# Patient Record
Sex: FEMALE | Race: WHITE | Employment: UNEMPLOYED | URBAN - METROPOLITAN AREA
[De-identification: names, ages, dates, MRNs, and addresses within clinical notes are randomized per-mention and may not be internally consistent; named-entity substitution may affect disease eponyms.]

---

## 2017-04-07 PROBLEM — E78.5 HYPERLIPIDEMIA, UNSPECIFIED HYPERLIPIDEMIA TYPE: Status: ACTIVE | Noted: 2017-04-07

## 2017-09-19 PROCEDURE — 82043 UR ALBUMIN QUANTITATIVE: CPT | Performed by: FAMILY MEDICINE

## 2017-09-19 PROCEDURE — 82570 ASSAY OF URINE CREATININE: CPT | Performed by: FAMILY MEDICINE

## 2017-10-30 PROCEDURE — 87338 HPYLORI STOOL AG IA: CPT | Performed by: FAMILY MEDICINE

## 2018-03-30 PROCEDURE — 82570 ASSAY OF URINE CREATININE: CPT | Performed by: FAMILY MEDICINE

## 2018-03-30 PROCEDURE — 82043 UR ALBUMIN QUANTITATIVE: CPT | Performed by: FAMILY MEDICINE

## 2018-05-18 PROCEDURE — 88175 CYTOPATH C/V AUTO FLUID REDO: CPT | Performed by: OBSTETRICS & GYNECOLOGY

## 2018-06-26 PROCEDURE — 87086 URINE CULTURE/COLONY COUNT: CPT | Performed by: FAMILY MEDICINE

## 2019-05-16 PROCEDURE — 86803 HEPATITIS C AB TEST: CPT | Performed by: FAMILY MEDICINE

## 2019-09-28 PROBLEM — H25.13 NUCLEAR SCLEROTIC CATARACT OF BOTH EYES: Status: ACTIVE | Noted: 2019-09-28

## 2019-09-28 PROBLEM — E11.9 TYPE 2 DIABETES MELLITUS WITHOUT COMPLICATION, WITHOUT LONG-TERM CURRENT USE OF INSULIN (HCC): Status: ACTIVE | Noted: 2019-09-28

## 2019-09-28 PROBLEM — H35.3132 NONEXUDATIVE AGE-RELATED MACULAR DEGENERATION, BILATERAL, INTERMEDIATE DRY STAGE: Status: ACTIVE | Noted: 2019-09-28

## 2019-09-28 PROBLEM — H04.123 DRY EYE SYNDROME OF BILATERAL LACRIMAL GLANDS: Status: ACTIVE | Noted: 2019-09-28

## 2019-11-23 PROBLEM — H02.883 MEIBOMIAN GLAND DYSFUNCTION (MGD) OF BOTH EYES: Status: ACTIVE | Noted: 2019-11-23

## 2019-11-23 PROBLEM — H02.886 MEIBOMIAN GLAND DYSFUNCTION (MGD) OF BOTH EYES: Status: ACTIVE | Noted: 2019-11-23

## 2021-06-10 PROBLEM — M54.31 SCIATICA OF RIGHT SIDE: Status: ACTIVE | Noted: 2021-06-10

## 2021-06-22 PROBLEM — S13.4XXA WHIPLASH INJURY TO NECK: Status: ACTIVE | Noted: 2021-06-22

## 2021-06-22 PROBLEM — M25.511 SHOULDER PAIN, BILATERAL: Status: ACTIVE | Noted: 2021-06-22

## 2021-06-22 PROBLEM — M25.512 SHOULDER PAIN, BILATERAL: Status: ACTIVE | Noted: 2021-06-22

## 2021-06-22 PROBLEM — M54.2 CERVICALGIA: Status: ACTIVE | Noted: 2021-06-22
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
## 2021-08-09 PROBLEM — S13.4XXD WHIPLASH INJURIES, SUBSEQUENT ENCOUNTER: Status: ACTIVE | Noted: 2021-06-22

## 2021-08-09 PROBLEM — G44.86 CERVICOGENIC HEADACHE: Status: ACTIVE | Noted: 2021-08-09

## 2021-08-09 PROBLEM — M47.812 CS (CERVICAL SPONDYLOSIS): Status: ACTIVE | Noted: 2021-08-09

## 2022-03-28 PROBLEM — M50.00 CERVICAL DISC DISORDER WITH MYELOPATHY: Status: ACTIVE | Noted: 2022-03-28

## 2023-03-15 LAB
ANION GAP SERPL CALC-SCNC: 11 MMOL/L (ref 0–18)
BASOPHILS # BLD AUTO: 0.04 X10(3) UL (ref 0–0.2)
BASOPHILS NFR BLD AUTO: 0.4 %
BUN BLD-MCNC: 23 MG/DL (ref 7–18)
BUN/CREAT SERPL: 19.7 (ref 10–20)
CALCIUM BLD-MCNC: 9.8 MG/DL (ref 8.5–10.1)
CHLORIDE SERPL-SCNC: 105 MMOL/L (ref 98–112)
CO2 SERPL-SCNC: 22 MMOL/L (ref 21–32)
CREAT BLD-MCNC: 1.17 MG/DL
DEPRECATED RDW RBC AUTO: 41.6 FL (ref 35.1–46.3)
EOSINOPHIL # BLD AUTO: 0.16 X10(3) UL (ref 0–0.7)
EOSINOPHIL NFR BLD AUTO: 1.8 %
ERYTHROCYTE [DISTWIDTH] IN BLOOD BY AUTOMATED COUNT: 13.2 % (ref 11–15)
GFR SERPLBLD BASED ON 1.73 SQ M-ARVRAT: 51 ML/MIN/1.73M2 (ref 60–?)
GLUCOSE BLD-MCNC: 139 MG/DL (ref 70–99)
HCT VFR BLD AUTO: 37.5 %
HGB BLD-MCNC: 12.7 G/DL
IMM GRANULOCYTES # BLD AUTO: 0.02 X10(3) UL (ref 0–1)
IMM GRANULOCYTES NFR BLD: 0.2 %
LYMPHOCYTES # BLD AUTO: 1.71 X10(3) UL (ref 1–4)
LYMPHOCYTES NFR BLD AUTO: 19 %
MCH RBC QN AUTO: 29.4 PG (ref 26–34)
MCHC RBC AUTO-ENTMCNC: 33.9 G/DL (ref 31–37)
MCV RBC AUTO: 86.8 FL
MONOCYTES # BLD AUTO: 0.64 X10(3) UL (ref 0.1–1)
MONOCYTES NFR BLD AUTO: 7.1 %
NEUTROPHILS # BLD AUTO: 6.43 X10 (3) UL (ref 1.5–7.7)
NEUTROPHILS # BLD AUTO: 6.43 X10(3) UL (ref 1.5–7.7)
NEUTROPHILS NFR BLD AUTO: 71.5 %
OSMOLALITY SERPL CALC.SUM OF ELEC: 292 MOSM/KG (ref 275–295)
PLATELET # BLD AUTO: 317 10(3)UL (ref 150–450)
POTASSIUM SERPL-SCNC: 4 MMOL/L (ref 3.5–5.1)
RBC # BLD AUTO: 4.32 X10(6)UL
SODIUM SERPL-SCNC: 138 MMOL/L (ref 136–145)
WBC # BLD AUTO: 9 X10(3) UL (ref 4–11)

## 2023-03-15 PROCEDURE — 93010 ELECTROCARDIOGRAM REPORT: CPT

## 2023-03-15 PROCEDURE — 99284 EMERGENCY DEPT VISIT MOD MDM: CPT

## 2023-03-15 PROCEDURE — 85025 COMPLETE CBC W/AUTO DIFF WBC: CPT | Performed by: EMERGENCY MEDICINE

## 2023-03-15 PROCEDURE — 80048 BASIC METABOLIC PNL TOTAL CA: CPT | Performed by: EMERGENCY MEDICINE

## 2023-03-15 PROCEDURE — 80048 BASIC METABOLIC PNL TOTAL CA: CPT

## 2023-03-15 PROCEDURE — 85025 COMPLETE CBC W/AUTO DIFF WBC: CPT

## 2023-03-15 PROCEDURE — 93005 ELECTROCARDIOGRAM TRACING: CPT

## 2023-03-16 ENCOUNTER — HOSPITAL ENCOUNTER (EMERGENCY)
Facility: HOSPITAL | Age: 68
Discharge: HOME | End: 2023-03-16
Attending: EMERGENCY MEDICINE
Payer: COMMERCIAL

## 2023-03-16 VITALS
HEART RATE: 82 BPM | OXYGEN SATURATION: 94 % | DIASTOLIC BLOOD PRESSURE: 64 MMHG | SYSTOLIC BLOOD PRESSURE: 143 MMHG | TEMPERATURE: 97 F | RESPIRATION RATE: 19 BRPM

## 2023-03-16 DIAGNOSIS — R03.0 ELEVATED BLOOD PRESSURE READING: ICD-10-CM

## 2023-03-16 DIAGNOSIS — G44.209 TENSION HEADACHE: Primary | ICD-10-CM

## 2023-03-16 LAB
ATRIAL RATE: 104 BPM
P AXIS: 69 DEGREES
P-R INTERVAL: 178 MS
Q-T INTERVAL: 346 MS
QRS DURATION: 72 MS
QTC CALCULATION (BEZET): 454 MS
R AXIS: -27 DEGREES
T AXIS: 62 DEGREES
VENTRICULAR RATE: 104 BPM

## 2023-03-16 PROCEDURE — 96374 THER/PROPH/DIAG INJ IV PUSH: CPT

## 2023-03-16 PROCEDURE — 96375 TX/PRO/DX INJ NEW DRUG ADDON: CPT

## 2023-03-16 PROCEDURE — 96361 HYDRATE IV INFUSION ADD-ON: CPT

## 2023-03-16 RX ORDER — KETOROLAC TROMETHAMINE 15 MG/ML
15 INJECTION, SOLUTION INTRAMUSCULAR; INTRAVENOUS ONCE
Status: COMPLETED | OUTPATIENT
Start: 2023-03-16 | End: 2023-03-16

## 2023-03-16 RX ORDER — TRAMADOL HYDROCHLORIDE 50 MG/1
25 TABLET ORAL EVERY 6 HOURS PRN
Qty: 10 TABLET | Refills: 0 | Status: SHIPPED | OUTPATIENT
Start: 2023-03-16 | End: 2023-03-21

## 2023-03-16 RX ORDER — DIPHENHYDRAMINE HYDROCHLORIDE 50 MG/ML
25 INJECTION INTRAMUSCULAR; INTRAVENOUS ONCE
Status: COMPLETED | OUTPATIENT
Start: 2023-03-16 | End: 2023-03-16

## 2023-03-16 RX ORDER — SODIUM CHLORIDE 9 MG/ML
125 INJECTION, SOLUTION INTRAVENOUS CONTINUOUS
Status: DISCONTINUED | OUTPATIENT
Start: 2023-03-16 | End: 2023-03-16

## 2023-03-16 RX ORDER — ACETAMINOPHEN, ASPIRIN AND CAFFEINE 250; 250; 65 MG/1; MG/1; MG/1
1 TABLET, FILM COATED ORAL EVERY 6 HOURS PRN
Qty: 10 TABLET | Refills: 0 | Status: SHIPPED | OUTPATIENT
Start: 2023-03-16

## 2023-03-16 RX ORDER — PROCHLORPERAZINE EDISYLATE 5 MG/ML
10 INJECTION INTRAMUSCULAR; INTRAVENOUS ONCE
Status: COMPLETED | OUTPATIENT
Start: 2023-03-16 | End: 2023-03-16

## 2023-03-16 NOTE — ED INITIAL ASSESSMENT (HPI)
Pt reports 3 weeks of headache/pressure, seen multiple times and dx w/ sinus infection, also had CT scan on Monday. Pt c/o continued s/s w/o relief from sudafed at home.

## 2023-03-16 NOTE — ED QUICK NOTES
Upon reassessment, patient resting in bed with eyes closed, breathing easily, and relaxed facial expressions. When asked about pain, patient states she is still in a lot of pain. Medication administered. Lights dimmed and another warm blanket provided. Will continue to monitor.

## 2023-03-16 NOTE — DISCHARGE INSTRUCTIONS
Try to stay well-hydrated at home. Please return to the emergency department if you develop worsening of your headache or new headache which is severe, associated with vision changes, associated with neck stiffness or fever, or if it is different from any other headache that you have had before. Return to the emergency department if you develop numbness, weakness or tingling or problems with coordination, or if you develop severe nausea and vomiting and are unable to keep down fluids at home. Keep the appointment you have with your primary care doctor. If your headache persist, you may make an appointment with a neurologist for follow-up.

## 2024-06-07 ENCOUNTER — HOSPITAL ENCOUNTER (EMERGENCY)
Facility: HOSPITAL | Age: 69
Discharge: HOME | End: 2024-06-07
Attending: EMERGENCY MEDICINE
Payer: COMMERCIAL

## 2024-06-07 VITALS
RESPIRATION RATE: 18 BRPM | BODY MASS INDEX: 32.1 KG/M2 | OXYGEN SATURATION: 95 % | TEMPERATURE: 99 F | HEIGHT: 64 IN | WEIGHT: 188 LBS | SYSTOLIC BLOOD PRESSURE: 165 MMHG | DIASTOLIC BLOOD PRESSURE: 74 MMHG | HEART RATE: 86 BPM

## 2024-06-07 DIAGNOSIS — R51.9 CHRONIC NONINTRACTABLE HEADACHE, UNSPECIFIED HEADACHE TYPE: Primary | ICD-10-CM

## 2024-06-07 DIAGNOSIS — G89.29 CHRONIC NONINTRACTABLE HEADACHE, UNSPECIFIED HEADACHE TYPE: Primary | ICD-10-CM

## 2024-06-07 LAB
ATRIAL RATE: 96 BPM
P AXIS: 48 DEGREES
P-R INTERVAL: 156 MS
Q-T INTERVAL: 356 MS
QRS DURATION: 68 MS
QTC CALCULATION (BEZET): 449 MS
R AXIS: -28 DEGREES
T AXIS: 40 DEGREES
VENTRICULAR RATE: 96 BPM

## 2024-06-07 PROCEDURE — 93010 ELECTROCARDIOGRAM REPORT: CPT

## 2024-06-07 PROCEDURE — 99284 EMERGENCY DEPT VISIT MOD MDM: CPT

## 2024-06-07 PROCEDURE — S0028 INJECTION, FAMOTIDINE, 20 MG: HCPCS | Performed by: EMERGENCY MEDICINE

## 2024-06-07 PROCEDURE — 93005 ELECTROCARDIOGRAM TRACING: CPT

## 2024-06-07 PROCEDURE — 96375 TX/PRO/DX INJ NEW DRUG ADDON: CPT

## 2024-06-07 PROCEDURE — 96374 THER/PROPH/DIAG INJ IV PUSH: CPT

## 2024-06-07 PROCEDURE — 99285 EMERGENCY DEPT VISIT HI MDM: CPT

## 2024-06-07 PROCEDURE — 96361 HYDRATE IV INFUSION ADD-ON: CPT

## 2024-06-07 RX ORDER — HYDRALAZINE HYDROCHLORIDE 50 MG/1
50 TABLET, FILM COATED ORAL 3 TIMES DAILY
COMMUNITY

## 2024-06-07 RX ORDER — SUMATRIPTAN 50 MG/1
50 TABLET, FILM COATED ORAL ONCE
Status: COMPLETED | OUTPATIENT
Start: 2024-06-07 | End: 2024-06-07

## 2024-06-07 RX ORDER — DEXAMETHASONE SODIUM PHOSPHATE 4 MG/ML
6 VIAL (ML) INJECTION ONCE
Status: COMPLETED | OUTPATIENT
Start: 2024-06-07 | End: 2024-06-07

## 2024-06-07 RX ORDER — DEXAMETHASONE 4 MG/1
8 TABLET ORAL
Qty: 6 TABLET | Refills: 0 | Status: SHIPPED | OUTPATIENT
Start: 2024-06-07 | End: 2024-06-10

## 2024-06-07 RX ORDER — FAMOTIDINE 10 MG/ML
20 INJECTION, SOLUTION INTRAVENOUS ONCE
Status: COMPLETED | OUTPATIENT
Start: 2024-06-07 | End: 2024-06-07

## 2024-06-07 RX ORDER — TOPIRAMATE 25 MG/ML
25 SOLUTION ORAL DAILY
Qty: 25 ML | Refills: 0 | Status: SHIPPED | OUTPATIENT
Start: 2024-06-07 | End: 2024-06-28

## 2024-06-07 RX ORDER — TOPIRAMATE 25 MG/1
25 TABLET ORAL DAILY
Qty: 21 TABLET | Refills: 0 | Status: SHIPPED | OUTPATIENT
Start: 2024-06-07 | End: 2024-06-28

## 2024-06-07 RX ORDER — HALOPERIDOL 5 MG/ML
2.5 INJECTION INTRAMUSCULAR ONCE
Status: DISCONTINUED | OUTPATIENT
Start: 2024-06-07 | End: 2024-06-07

## 2024-06-07 RX ORDER — ONDANSETRON 2 MG/ML
4 INJECTION INTRAMUSCULAR; INTRAVENOUS ONCE
Status: DISCONTINUED | OUTPATIENT
Start: 2024-06-07 | End: 2024-06-07

## 2024-06-07 RX ORDER — MORPHINE SULFATE 2 MG/ML
2 INJECTION, SOLUTION INTRAMUSCULAR; INTRAVENOUS ONCE
Status: COMPLETED | OUTPATIENT
Start: 2024-06-07 | End: 2024-06-07

## 2024-06-07 NOTE — ED INITIAL ASSESSMENT (HPI)
Pt presents with c/o migraines.  Pt report that she took Fioricet and Codeine, but it didn't help.  She reports nausea and feeling sick.  Pt states she took something for nausea at 10am without any relief.  Pt states that she is dizzy, feeling like she is going to pass out.  No visual disturbances.

## 2024-06-07 NOTE — ED PROVIDER NOTES
Patient Seen in: [facility] Emergency Department    History     Chief Complaint   Patient presents with    Headache    Dizziness     Stated Complaint: Migrane, Nausea    HPI  Pt c/o a 9/10 headache that began 4 months ago.  .  This is similar to previous headaches.  Pain is described as r side throbbing . Headache is associated with photophobia.  no fever, no stiff neck, no visual changes and no focal neuro deficit.      Past Medical History:    Bacterial overgrowth syndrome    positive by breath test: both hydrogen and methane criteria.    Diabetes (HCC)    Migraines    Other and unspecified hyperlipidemia    Unspecified essential hypertension       Past Surgical History:   Procedure Laterality Date    Colonoscopy      Excisional biopsy left  2008    Hernia surgery      umbilical hernia surgery    Hysterectomy      in 30's, uterine prolapse    Other surgical history  2012    left breast biopsy--normal/benign findings    Other surgical history  2012    right knee arthroscopy            Family History   Problem Relation Age of Onset    Hypertension Father     Hypertension Mother     Diabetes Maternal Grandmother     Lipids Maternal Grandmother     Hypertension Maternal Grandmother     Diabetes Maternal Grandfather     Lipids Maternal Grandfather     Hypertension Maternal Grandfather     Hypertension Sister     Hypertension Brother        Social History     Socioeconomic History    Marital status:    Tobacco Use    Smoking status: Never     Passive exposure: Never    Smokeless tobacco: Never   Vaping Use    Vaping status: Never Used   Substance and Sexual Activity    Alcohol use: No     Alcohol/week: 0.0 standard drinks of alcohol    Drug use: No     Social Determinants of Health      Received from [facility]       Review of Systems    Positive for stated complaint: Migrane, Nausea  Other systems are as noted in HPI.  Constitutional and vital signs reviewed.      All other systems  reviewed and negative except as noted above.    PSFH elements reviewed from today and agreed except as otherwise stated in HPI.    Physical Exam     ED Triage Vitals [06/07/24 1158]   /79   Pulse 96   Resp 18   Temp 98.5 °F (36.9 °C)   Temp src Oral   SpO2 97 %   O2 Device        Current:BP (!) 165/74   Pulse 86   Temp 98.5 °F (36.9 °C) (Oral)   Resp 18   Ht 162.6 cm (5' 4\")   Wt 85.3 kg   SpO2 95%   BMI 32.27 kg/m²   PULSE OX nl  Constitutional:  No acute distress  HEENT:  Head normocephalic and atraumatic. No scleral icterus or erythema. Pharynx moist without erythema or exudate.  CV:  Regular rate and rhythm. No murmur. Peripheral pulses intact.  Respiratory:  Lungs clear to auscultation bilaterally  Abdomen:  Soft, non-tender, non-distended.  Back:  No CVA or vertebral tenderness  Skin:  Normal color. Warm and Dry  Extremities:  Non-tender. No pedal edema.   Neuro:  Oriented x3.  PERRL, EOMI. CN II - XII grossly intact.  No gross motor deficits.  5/5 strength in all distribution.  Sensation fully intact.      DDX to include tension headache vs. Migraine headache vs. Sinusitis vs. SAH        ED Course   Labs Reviewed - No data to display  EKG    Rate, intervals and axes as noted on EKG Report.  Rate: 96  Rhythm: Sinus Rhythm  Reading: nsr with lvh            MDM     Medical Decision Making  Problems Addressed:  Chronic nonintractable headache, unspecified headache type: chronic illness or injury with exacerbation, progression, or side effects of treatment    Risk  Prescription drug management.          Re-Exam: feeling better will dc    Patient presenting with headache.  Patient  with no abnormal symptoms for patient.  Patient was discharged home. Patient advised to return to ER if alteration in mental status, onset of fevers, visual changes, or peripheral weakness/numbness/tingling.  Disposition and Plan     Clinical Impression:  1. Chronic nonintractable headache, unspecified headache type         Disposition:  Discharge    Follow-up:  Avtar Mccoy DO  [address]   [place] [address] [phone]    Follow up      Awa Sutton DO  [address] [facility] [address]  [facility] [id number] [phone]    Follow up        Medications Prescribed:  Discharge Medication List as of 6/7/2024  4:13 PM        START taking these medications    Details   topiramate (TOPAMAX) 25 MG Oral Tab Take 1 tablet (25 mg total) by mouth daily for 21 days., Normal, Disp-21 tablet, R-0      dexamethasone (DECADRON) 4 MG tablet Take 2 tablets (8 mg total) by mouth daily with breakfast for 3 days., Normal, Disp-6 tablet, R-0      Topiramate (EPRONTIA) 25 MG/ML Oral Solution Take 25 mg by mouth daily for 21 days., Normal, Disp-25 mL, R-0

## 2024-06-12 ENCOUNTER — TELEPHONE (OUTPATIENT)
Dept: NEUROLOGY | Facility: CLINIC | Age: 69
End: 2024-06-12

## 2024-06-12 NOTE — TELEPHONE ENCOUNTER
Pt called in was seen in ed 6/7 was advised to f/ up with dr kenney. Scheduled for Sept 4th at  1140. Pls advise where to schedule pt.

## 2024-06-28 ENCOUNTER — HOSPITAL ENCOUNTER (EMERGENCY)
Facility: HOSPITAL | Age: 69
Discharge: HOME | End: 2024-06-28
Attending: EMERGENCY MEDICINE
Payer: COMMERCIAL

## 2024-06-28 VITALS
OXYGEN SATURATION: 97 % | HEART RATE: 85 BPM | WEIGHT: 188 LBS | RESPIRATION RATE: 22 BRPM | DIASTOLIC BLOOD PRESSURE: 66 MMHG | HEIGHT: 64 IN | TEMPERATURE: 98 F | BODY MASS INDEX: 32.1 KG/M2 | SYSTOLIC BLOOD PRESSURE: 142 MMHG

## 2024-06-28 DIAGNOSIS — R51.9 CHRONIC NONINTRACTABLE HEADACHE, UNSPECIFIED HEADACHE TYPE: Primary | ICD-10-CM

## 2024-06-28 DIAGNOSIS — G89.29 CHRONIC NONINTRACTABLE HEADACHE, UNSPECIFIED HEADACHE TYPE: Primary | ICD-10-CM

## 2024-06-28 LAB
ALBUMIN SERPL-MCNC: 4.6 G/DL (ref 3.2–4.8)
ALBUMIN/GLOB SERPL: 1.9 {RATIO} (ref 1–2)
ALP LIVER SERPL-CCNC: 120 U/L
ALT SERPL-CCNC: 17 U/L
ANION GAP SERPL CALC-SCNC: 8 MMOL/L (ref 0–18)
AST SERPL-CCNC: 20 U/L (ref ?–34)
ATRIAL RATE: 94 BPM
BASOPHILS # BLD AUTO: 0.04 X10(3) UL (ref 0–0.2)
BASOPHILS NFR BLD AUTO: 0.5 %
BILIRUB SERPL-MCNC: 0.2 MG/DL (ref 0.2–1.1)
BUN BLD-MCNC: 16 MG/DL (ref 9–23)
BUN/CREAT SERPL: 16.3 (ref 10–20)
CALCIUM BLD-MCNC: 10.2 MG/DL (ref 8.7–10.4)
CHLORIDE SERPL-SCNC: 104 MMOL/L (ref 98–112)
CO2 SERPL-SCNC: 23 MMOL/L (ref 21–32)
CREAT BLD-MCNC: 0.98 MG/DL
DEPRECATED RDW RBC AUTO: 43.8 FL (ref 35.1–46.3)
EGFRCR SERPLBLD CKD-EPI 2021: 62 ML/MIN/1.73M2 (ref 60–?)
EOSINOPHIL # BLD AUTO: 0.02 X10(3) UL (ref 0–0.7)
EOSINOPHIL NFR BLD AUTO: 0.3 %
ERYTHROCYTE [DISTWIDTH] IN BLOOD BY AUTOMATED COUNT: 13.8 % (ref 11–15)
GLOBULIN PLAS-MCNC: 2.4 G/DL (ref 2–3.5)
GLUCOSE BLD-MCNC: 130 MG/DL (ref 70–99)
HCT VFR BLD AUTO: 36.9 %
HGB BLD-MCNC: 13.1 G/DL
IMM GRANULOCYTES # BLD AUTO: 0.04 X10(3) UL (ref 0–1)
IMM GRANULOCYTES NFR BLD: 0.5 %
LYMPHOCYTES # BLD AUTO: 1.15 X10(3) UL (ref 1–4)
LYMPHOCYTES NFR BLD AUTO: 14.5 %
MCH RBC QN AUTO: 30.7 PG (ref 26–34)
MCHC RBC AUTO-ENTMCNC: 35.5 G/DL (ref 31–37)
MCV RBC AUTO: 86.4 FL
MONOCYTES # BLD AUTO: 0.52 X10(3) UL (ref 0.1–1)
MONOCYTES NFR BLD AUTO: 6.6 %
NEUTROPHILS # BLD AUTO: 6.14 X10 (3) UL (ref 1.5–7.7)
NEUTROPHILS # BLD AUTO: 6.14 X10(3) UL (ref 1.5–7.7)
NEUTROPHILS NFR BLD AUTO: 77.6 %
OSMOLALITY SERPL CALC.SUM OF ELEC: 283 MOSM/KG (ref 275–295)
P AXIS: 55 DEGREES
P-R INTERVAL: 164 MS
PLATELET # BLD AUTO: 288 10(3)UL (ref 150–450)
POTASSIUM SERPL-SCNC: 4.3 MMOL/L (ref 3.5–5.1)
PROT SERPL-MCNC: 7 G/DL (ref 5.7–8.2)
Q-T INTERVAL: 366 MS
QRS DURATION: 74 MS
QTC CALCULATION (BEZET): 457 MS
R AXIS: -32 DEGREES
RBC # BLD AUTO: 4.27 X10(6)UL
SODIUM SERPL-SCNC: 135 MMOL/L (ref 136–145)
T AXIS: 50 DEGREES
VENTRICULAR RATE: 94 BPM
WBC # BLD AUTO: 7.9 X10(3) UL (ref 4–11)

## 2024-06-28 PROCEDURE — 99284 EMERGENCY DEPT VISIT MOD MDM: CPT

## 2024-06-28 PROCEDURE — 85025 COMPLETE CBC W/AUTO DIFF WBC: CPT | Performed by: EMERGENCY MEDICINE

## 2024-06-28 PROCEDURE — 93005 ELECTROCARDIOGRAM TRACING: CPT

## 2024-06-28 PROCEDURE — 96374 THER/PROPH/DIAG INJ IV PUSH: CPT

## 2024-06-28 PROCEDURE — 80053 COMPREHEN METABOLIC PANEL: CPT | Performed by: EMERGENCY MEDICINE

## 2024-06-28 PROCEDURE — 93010 ELECTROCARDIOGRAM REPORT: CPT

## 2024-06-28 PROCEDURE — 96361 HYDRATE IV INFUSION ADD-ON: CPT

## 2024-06-28 PROCEDURE — 96375 TX/PRO/DX INJ NEW DRUG ADDON: CPT

## 2024-06-28 RX ORDER — KETOROLAC TROMETHAMINE 15 MG/ML
15 INJECTION, SOLUTION INTRAMUSCULAR; INTRAVENOUS ONCE
Status: COMPLETED | OUTPATIENT
Start: 2024-06-28 | End: 2024-06-28

## 2024-06-28 RX ORDER — METOCLOPRAMIDE HYDROCHLORIDE 5 MG/ML
10 INJECTION INTRAMUSCULAR; INTRAVENOUS ONCE
Status: COMPLETED | OUTPATIENT
Start: 2024-06-28 | End: 2024-06-28

## 2024-06-28 RX ORDER — DIPHENHYDRAMINE HYDROCHLORIDE 50 MG/ML
25 INJECTION INTRAMUSCULAR; INTRAVENOUS ONCE
Status: COMPLETED | OUTPATIENT
Start: 2024-06-28 | End: 2024-06-28

## 2024-06-28 RX ORDER — ORPHENADRINE CITRATE 30 MG/ML
30 INJECTION INTRAMUSCULAR; INTRAVENOUS ONCE
Status: COMPLETED | OUTPATIENT
Start: 2024-06-28 | End: 2024-06-28

## 2024-06-28 RX ORDER — TIZANIDINE 2 MG/1
2 TABLET ORAL EVERY 6 HOURS PRN
Qty: 16 TABLET | Refills: 0 | Status: SHIPPED | OUTPATIENT
Start: 2024-06-28 | End: 2024-07-02

## 2024-06-28 NOTE — ED PROVIDER NOTES
Patient Seen in: [facility] Emergency Department      History     Chief Complaint   Patient presents with    Headache     Stated Complaint: HTN/headache    Subjective:   HPI    69-year-old female presents for evaluation for headache.  Patient reports that her headache has been present for the past 5 months.  Headache is daily.  She states that the headache is generalized and associated with photophobia and nausea.  She reports being seen by multiple different providers for the same and has tried multiple treatments.  She was seen here and started on Topamax and reports that it caused dizziness.  She reports trying Imitrex as well which is not helping.  She denies any fevers or chills.  Does endorse nausea and is on Protonix.  She denies any focal neurologic symptoms.  Headache is not thunderclap in nature.    Objective:   Past Medical History:    Bacterial overgrowth syndrome    positive by breath test: both hydrogen and methane criteria.    Diabetes (HCC)    Migraines    Other and unspecified hyperlipidemia    Unspecified essential hypertension              No pertinent past surgical history.              No pertinent social history.            Review of Systems    Positive for stated Chief Complaint: Headache    Other systems are as noted in HPI.  Constitutional and vital signs reviewed.      All other systems reviewed and negative except as noted above.    Physical Exam     ED Triage Vitals [06/28/24 1202]   /75   Pulse 96   Resp 18   Temp 97.7 °F (36.5 °C)   Temp src Oral   SpO2 98 %   O2 Device None (Room air)       Current Vitals:   Vital Signs  BP: (!) 167/83  Pulse: 86  Resp: 17  Temp: 97.7 °F (36.5 °C)  Temp src: Oral  MAP (mmHg): (!) 106    Oxygen Therapy  SpO2: 94 %  O2 Device: None (Room air)            Physical Exam  Vitals and nursing note reviewed.   Constitutional:       General: She is not in acute distress.     Appearance: Normal appearance.   HENT:      Head: Normocephalic and  atraumatic.      Jaw: No trismus.      Right Ear: No mastoid tenderness.      Left Ear: No mastoid tenderness.      Nose: Nose normal. No nasal deformity.      Right Nostril: No epistaxis.      Left Nostril: No epistaxis.      Mouth/Throat:      Lips: Pink.      Mouth: Mucous membranes are moist. No angioedema.      Pharynx: Oropharynx is clear. Uvula midline. No oropharyngeal exudate, posterior oropharyngeal erythema or uvula swelling.      Tonsils: No tonsillar exudate or tonsillar abscesses.   Eyes:      General: Lids are normal.      Extraocular Movements: Extraocular movements intact.      Conjunctiva/sclera: Conjunctivae normal.      Pupils: Pupils are equal, round, and reactive to light.   Cardiovascular:      Rate and Rhythm: Normal rate and regular rhythm.      Pulses: Normal pulses.      Heart sounds: Normal heart sounds.   Pulmonary:      Effort: Pulmonary effort is normal. No respiratory distress.      Breath sounds: Normal breath sounds and air entry.   Musculoskeletal:         General: No deformity. Normal range of motion.      Cervical back: Normal range of motion. No rigidity.   Skin:     General: Skin is warm and dry.      Coloration: Skin is not cyanotic.   Neurological:      General: No focal deficit present.      Mental Status: She is alert. Mental status is at baseline.      Cranial Nerves: No cranial nerve deficit, dysarthria or facial asymmetry.      Sensory: Sensation is intact.      Motor: Motor function is intact.      Coordination: Coordination is intact.      Gait: Gait is intact.   Psychiatric:         Attention and Perception: Attention normal.         Mood and Affect: Mood normal.         Speech: Speech normal.               ED Course     Labs Reviewed   COMP METABOLIC PANEL (14) - Abnormal; Notable for the following components:       Result Value    Glucose 130 (*)     Sodium 135 (*)     All other components within normal limits   CBC WITH DIFFERENTIAL WITH PLATELET    Narrative:      The following orders were created for panel order CBC With Differential With Platelet.  Procedure                               Abnormality         Status                     ---------                               -----------         ------                     CBC W/ DIFFERENTIAL[[id number]]                              Final result                 Please view results for these tests on the individual orders.   RAINBOW DRAW BLUE   CBC W/ DIFFERENTIAL     EKG    Rate, intervals and axes as noted on EKG Report.  Rate: 94  Rhythm: Sinus Rhythm  Reading: no stemi, interpreted by myself.                           MDM                                         Medical Decision Making  Differential diagnosis includes but is not limited to migraine headache, tension headache, cluster headache, sinusitis, etc.    Headache was gradual in onset and not worst of life, I do not suspect subarachnoid hemorrhage at this time. There is no fever or meningismus to suggest meningitis. There are no neurologic deficits, I do not suspect any intracranial mass at this time.  Patient is given IV fluids, Benadryl, Toradol and Reglan.  She reports that her headache is still present.  She will be given some Norflex to see if that helps.  She is endorsed to incoming ED physician anticipated discharge home.    Medical Record Review: I personally reviewed available prior medical records for any recent pertinent discharge summaries, testing, and procedures, and reviewed those reports.    Complicating factors: The patient  has a past medical history of Bacterial overgrowth syndrome, Diabetes (HCC), Migraines, Other and unspecified hyperlipidemia, and Unspecified essential hypertension. and  has a past surgical history that includes hernia surgery; other surgical history (2012); other surgical history (2012); hysterectomy; colonoscopy; and excisional biopsy left (2008). that contribute to the medical complexity of this ED evaluation.     Clinical  impression as well as any lab results and radiology findings were discussed with the patient and/or caregiver. I personally reviewed all laboratory results and radiology images myself. Patient is advised to follow up with PCP for reevaluation. I provided discharge instructions and return precautions. Patient and/or caregiver voices understanding and agreement with the treatment plan. All questions were addressed and answered.           Problems Addressed:  Chronic nonintractable headache, unspecified headache type: chronic illness or injury with severe exacerbation, progression, or side effects of treatment    Amount and/or Complexity of Data Reviewed  External Data Reviewed: notes.     Details: Patient was [facility] at [facility] on June 13 and given prescription for azithromycin for headache for possible sinusitis.  Patient's CT head on March 19 reviewed from this year, negative for any acute findings.  Patient CT abdomen pelvis from May 15 of this year reviewed, it shows gastritis.  Labs: ordered. Decision-making details documented in ED Course.        Disposition and Plan     Clinical Impression:  1. Chronic nonintractable headache, unspecified headache type         Disposition:  There is no disposition on file for this visit.  There is no disposition time on file for this visit.    Follow-up:  Avtar Mccoy DO  [address] [facility] [address] [phone]    Schedule an appointment as soon as possible for a visit  For follow up and re-evaluation    We recommend that you schedule follow up care with a primary care provider within the next three months to obtain basic health screening including reassessment of your blood pressure.      Medications Prescribed:  Current Discharge Medication List

## 2024-06-28 NOTE — ED PROVIDER NOTES
Dr Sotelo, has prepared discharge instructions , wanted to  have her observed after receiving muscle relaxer,   She looks good and states her pain is improved but still has HA. She has had HA for 5 months, has 2 neuro appoitnments coming up.  Tizanidine written she will not take fiorecet

## 2024-06-28 NOTE — ED INITIAL ASSESSMENT (HPI)
Pt presents to the ED with a headache. +nausea w/o emesis +dizziness Pt taking Fioricet and sumatriptan and getting some pain relief. Pt awaiting neurology eval next month.

## 2024-07-22 ENCOUNTER — OFFICE VISIT (OUTPATIENT)
Dept: NEUROLOGY | Facility: CLINIC | Age: 69
End: 2024-07-22
Payer: MEDICARE

## 2024-07-22 ENCOUNTER — PATIENT MESSAGE (OUTPATIENT)
Dept: NEUROLOGY | Facility: CLINIC | Age: 69
End: 2024-07-22

## 2024-07-22 VITALS
BODY MASS INDEX: 36.32 KG/M2 | RESPIRATION RATE: 16 BRPM | HEIGHT: 60 IN | HEART RATE: 81 BPM | SYSTOLIC BLOOD PRESSURE: 153 MMHG | DIASTOLIC BLOOD PRESSURE: 75 MMHG | WEIGHT: 185 LBS | OXYGEN SATURATION: 99 %

## 2024-07-22 DIAGNOSIS — G44.40 MEDICATION OVERUSE HEADACHE: ICD-10-CM

## 2024-07-22 DIAGNOSIS — G43.711 INTRACTABLE CHRONIC MIGRAINE WITHOUT AURA AND WITH STATUS MIGRAINOSUS: Primary | ICD-10-CM

## 2024-07-22 PROBLEM — M75.81 ROTATOR CUFF TENDINITIS, RIGHT: Status: ACTIVE | Noted: 2023-06-09

## 2024-07-22 PROBLEM — M19.019 OSTEOARTHRITIS OF AC (ACROMIOCLAVICULAR) JOINT: Status: ACTIVE | Noted: 2023-06-09

## 2024-07-22 PROBLEM — N18.31 STAGE 3A CHRONIC KIDNEY DISEASE (HCC): Status: ACTIVE | Noted: 2024-04-18

## 2024-07-22 PROBLEM — G95.20 SPINAL CORD COMPRESSION (HCC): Status: ACTIVE | Noted: 2024-04-18

## 2024-07-22 PROBLEM — R10.9 ABDOMINAL PAIN, ACUTE: Status: ACTIVE | Noted: 2024-05-15

## 2024-07-22 PROBLEM — G89.29 CHRONIC RIGHT SHOULDER PAIN: Status: ACTIVE | Noted: 2023-06-09

## 2024-07-22 PROBLEM — R51.9 WORSENING HEADACHES: Status: ACTIVE | Noted: 2021-06-22

## 2024-07-22 PROBLEM — M25.511 CHRONIC RIGHT SHOULDER PAIN: Status: ACTIVE | Noted: 2023-06-09

## 2024-07-22 PROCEDURE — 99204 OFFICE O/P NEW MOD 45 MIN: CPT | Performed by: OTHER

## 2024-07-22 RX ORDER — BUTALBITAL, ACETAMINOPHEN AND CAFFEINE 300; 40; 50 MG/1; MG/1; MG/1
CAPSULE ORAL
COMMUNITY
Start: 2024-02-28

## 2024-07-22 RX ORDER — DIAZEPAM 2 MG/1
TABLET ORAL
COMMUNITY
Start: 2024-03-12

## 2024-07-22 RX ORDER — SUCRALFATE 1 G/1
1 TABLET ORAL 4 TIMES DAILY
COMMUNITY
Start: 2024-05-15

## 2024-07-22 RX ORDER — SUMATRIPTAN 50 MG/1
TABLET, FILM COATED ORAL
COMMUNITY
Start: 2024-06-12 | End: 2024-07-22

## 2024-07-22 RX ORDER — FLUTICASONE PROPIONATE 50 MCG
2 SPRAY, SUSPENSION (ML) NASAL DAILY
COMMUNITY
Start: 2024-03-05

## 2024-07-22 RX ORDER — SUCRALFATE ORAL 1 G/10ML
SUSPENSION ORAL
COMMUNITY
Start: 2024-05-20

## 2024-07-22 RX ORDER — ONDANSETRON 4 MG/1
1 TABLET, ORALLY DISINTEGRATING ORAL EVERY 8 HOURS PRN
COMMUNITY
Start: 2024-05-01

## 2024-07-22 RX ORDER — DEXAMETHASONE 4 MG/1
TABLET ORAL
COMMUNITY
Start: 2024-06-07

## 2024-07-22 RX ORDER — CLOBETASOL PROPIONATE 0.5 MG/ML
SOLUTION TOPICAL
COMMUNITY
Start: 2024-07-08

## 2024-07-22 RX ORDER — DULOXETIN HYDROCHLORIDE 30 MG/1
30 CAPSULE, DELAYED RELEASE ORAL DAILY
Qty: 90 CAPSULE | Refills: 0 | Status: SHIPPED | OUTPATIENT
Start: 2024-07-22 | End: 2024-10-20

## 2024-07-22 RX ORDER — OLMESARTAN MEDOXOMIL 40 MG/1
TABLET ORAL
COMMUNITY
Start: 2024-02-13

## 2024-07-22 RX ORDER — HYDRALAZINE HYDROCHLORIDE 50 MG/1
1 TABLET, FILM COATED ORAL 3 TIMES DAILY
COMMUNITY
Start: 2024-04-24

## 2024-07-22 RX ORDER — DOXYCYCLINE HYCLATE 100 MG/1
CAPSULE ORAL
COMMUNITY
Start: 2024-02-16

## 2024-07-22 RX ORDER — PROPRANOLOL HYDROCHLORIDE 20 MG/1
20 TABLET ORAL 2 TIMES DAILY
COMMUNITY
Start: 2024-06-13

## 2024-07-22 RX ORDER — GABAPENTIN 100 MG/1
100 CAPSULE ORAL 3 TIMES DAILY
COMMUNITY
Start: 2024-02-13

## 2024-07-22 RX ORDER — BUTALBITAL, ACETAMINOPHEN, CAFFEINE AND CODEINE PHOSPHATE 300; 50; 40; 30 MG/1; MG/1; MG/1; MG/1
1 CAPSULE ORAL EVERY 6 HOURS PRN
COMMUNITY
Start: 2024-07-08 | End: 2024-07-22

## 2024-07-22 RX ORDER — UBROGEPANT 100 MG/1
TABLET ORAL
Qty: 10 TABLET | Refills: 0 | Status: SHIPPED | OUTPATIENT
Start: 2024-07-22 | End: 2025-07-22

## 2024-07-22 RX ORDER — FAMOTIDINE 40 MG/1
40 TABLET, FILM COATED ORAL 2 TIMES DAILY
COMMUNITY
Start: 2024-05-01

## 2024-07-22 RX ORDER — PANTOPRAZOLE SODIUM 40 MG/1
40 TABLET, DELAYED RELEASE ORAL 2 TIMES DAILY
COMMUNITY
Start: 2024-05-16

## 2024-07-22 RX ORDER — LORATADINE 10 MG/1
10 TABLET ORAL DAILY
COMMUNITY
Start: 2024-03-05

## 2024-07-22 RX ORDER — DIVALPROEX SODIUM 500 MG/1
TABLET, EXTENDED RELEASE ORAL
Qty: 15 TABLET | Refills: 0 | Status: SHIPPED | OUTPATIENT
Start: 2024-07-22 | End: 2024-07-31

## 2024-07-22 RX ORDER — CYCLOBENZAPRINE HCL 10 MG
10 TABLET ORAL
COMMUNITY
Start: 2024-07-18 | End: 2024-09-16

## 2024-07-22 RX ORDER — CLOTRIMAZOLE 10 MG/1
LOZENGE ORAL
COMMUNITY
Start: 2024-03-01

## 2024-07-22 RX ORDER — FLUCONAZOLE 150 MG/1
TABLET ORAL
COMMUNITY
Start: 2024-02-08

## 2024-07-22 RX ORDER — BUTALBITAL, ACETAMINOPHEN AND CAFFEINE 50; 325; 40 MG/1; MG/1; MG/1
TABLET ORAL
COMMUNITY
End: 2024-07-22

## 2024-07-22 RX ORDER — ALPRAZOLAM 0.25 MG/1
TABLET ORAL
COMMUNITY
Start: 2024-04-07

## 2024-07-22 NOTE — PATIENT INSTRUCTIONS
Please do not take Propranolol.      Please send my office the MRI brain and CT brain results.      Stop use of Fioricet.  It will lead to more medication overuse headaches.      To detox off Fiorcet, Depakote for 10 days   Add Duloxetine  For migraine treatment as needed you can use Ubrelvy   Botox

## 2024-07-22 NOTE — PROGRESS NOTES
[facility]  [address] [place] [address], [address]  [facility] [address]  [phone]              Date: July 22, 2024  Patient Name: Allie Mae   MRN: [id number]    Reason for Evaluation: Migraines     HPI:     Allie Mae is a 69 year old woman with past medical history of migraines, hypertension and hyperlipidemia, anxiety, diabetes who presents to establish care regarding migraines.      On review of the chart, the patient has presented to the ER several times in the past for headaches.  Most recently:  6/28/2024 - 5 months' holocephalic headache, photophobia, nausea.  Tried Topiramate (dizziness), Imitrex (not effective).  Hypertensive in ER (150/75; 167/83).  Given headache cocktail but headache persisted.    6/7/2024 - right sided throbbing headache with photophobia, 4 months' duration.  Hypertensive 156/79.  Started on Topiramate, given Dexamethasone (helped).      Currently taking Fioricet, Cyclobenzaprine.      Has a remote history of migraines.  Used to be responsive to Fioricet or Fioricet with codeine.       Frequency of Fioricet: almost daily.  Otherwise taking Tylenol daily.      Right > left temporal throbbing, photophobia and nausea.  Photophobia and nausea is a bit better.  Occiput involvement.  Holocephalic, retro-orbital.  Initially daily, now improved after cortisone shot.  Flexeril has helped with migraines.      Difficult historian.      OUTPATIENT MEDICATIONS  Current Outpatient Medications on File Prior to Visit   Medication Sig Dispense Refill    ALPRAZolam 0.25 MG Oral Tab       Butalbital-APAP-Caffeine -40 MG Oral Cap TAKE 1 CAPSULE BY MOUTH ONSET OF HEADACHE. NO MORE THAN 1 CAPSULE IN 24 HOURS      clobetasol 0.05 % External Solution Apply to affected area on scalp twice daily x 2-3 weeks as directed      clotrimazole 10 MG Mouth/Throat Laina DISSOLVE 1 LOZENGE BY MOUTH THREE TIMES DAILY AS NEEDED      cyclobenzaprine 10 MG Oral Tab Take 1 tablet (10 mg  total) by mouth daily as needed.      dexamethasone (DECADRON) 4 MG tablet       diazePAM 2 MG Oral Tab       diclofenac 50 MG Oral Tab EC       doxycycline 100 MG Oral Cap       famotidine 40 MG Oral Tab Take 1 tablet (40 mg total) by mouth 2 (two) times daily.      fluconazole 150 MG Oral Tab       fluticasone propionate 50 MCG/ACT Nasal Suspension 2 sprays by Nasal route daily.      gabapentin 100 MG Oral Cap Take 1 capsule (100 mg total) by mouth 3 (three) times daily.      loratadine 10 MG Oral Tab Take 1 tablet (10 mg total) by mouth daily.      Olmesartan Medoxomil 40 MG Oral Tab       ondansetron 4 MG Oral Tablet Dispersible Take 1 tablet (4 mg total) by mouth every 8 (eight) hours as needed.      pantoprazole 40 MG Oral Tab EC Take 1 tablet (40 mg total) by mouth 2 (two) times daily.      propranolol 20 MG Oral Tab Take 1 tablet (20 mg total) by mouth 2 (two) times daily.      sucralfate 1 g Oral Tab Take 1 tablet (1 g total) by mouth 4 (four) times daily.      sucralfate 1 GM/10ML Oral Suspension       hydrALAZINE 50 MG Oral Tab Take 1 tablet (50 mg total) by mouth 3 (three) times daily.      hydrALAZINE 50 MG Oral Tab Take 1 tablet (50 mg total) by mouth 3 (three) times daily.      simvastatin 40 MG Oral Tab Take 1 tablet (40 mg total) by mouth daily. 90 tablet 1    azithromycin (ZITHROMAX Z-ELISEO) 250 MG Oral Tab Take two tablets today, then one tablet daily for 4 more days (Patient not taking: Reported on 6/7/2024) 6 tablet 0    Probiotic Product (PROBIOTIC DAILY OR) Take by mouth.      verapamil 120 MG Oral Tab TAKE 1 TABLET BY MOUTH TWICE DAILY 180 tablet 1    Esomeprazole Magnesium 20 MG Oral Capsule Delayed Release Take 1 capsule (20 mg total) by mouth 2 (two) times a day. Before meal 120 capsule 0    IRBESARTAN 150 MG Oral Tab TAKE 1 TABLET BY MOUTH TWICE DAILY 180 tablet 1    METFORMIN 500 MG Oral Tab TAKE 1 TABLET(500 MG) BY MOUTH TWICE DAILY WITH MEALS 180 tablet 1    Azelastine HCl 0.1 % Nasal  Solution 1 spray by Nasal route 2 (two) times daily. 1 each 3    levothyroxine 50 MCG Oral Tab Take 1 tablet (50 mcg total) by mouth every morning before breakfast. 90 tablet 1    saccharomyces boulardii 250 MG Oral Cap Take 1 capsule (250 mg total) by mouth 2 (two) times daily. (Patient not taking: Reported on 6/7/2024) 60 capsule 1    Levocetirizine Dihydrochloride 5 MG Oral Tab Take 1 tablet (5 mg total) by mouth every evening. 90 tablet 0    Multiple Vitamins-Minerals (ICAPS AREDS 2 OR) Take 2 Scoops by mouth. 2 scoops at night (9.3gram) (Patient not taking: Reported on 6/7/2024)      cycloSPORINE (RESTASIS) 0.05 % Ophthalmic Emulsion Place 1 drop into both eyes 2 (two) times daily. 180 each 3    Glucose Blood (ACCU-CHEK CARIE PLUS) In Vitro Strip Test two times daily. 100 strip 3     No current facility-administered medications on file prior to visit.       MEDICAL HISTORY  Past Medical History:    Bacterial overgrowth syndrome    positive by breath test: both hydrogen and methane criteria.    Diabetes (HCC)    Migraines    Other and unspecified hyperlipidemia    Unspecified essential hypertension       SURGICAL HISTORY  Past Surgical History:   Procedure Laterality Date    Colonoscopy      Excisional biopsy left  2008    Hernia surgery      umbilical hernia surgery    Hysterectomy      in 30's, uterine prolapse    Other surgical history  2012    left breast biopsy--normal/benign findings    Other surgical history  2012    right knee arthroscopy       SOCIAL HISTORY  Social History     Socioeconomic History    Marital status:    Tobacco Use    Smoking status: Never     Passive exposure: Never    Smokeless tobacco: Never   Vaping Use    Vaping status: Never Used   Substance and Sexual Activity    Alcohol use: No     Alcohol/week: 0.0 standard drinks of alcohol    Drug use: No       FAMILY HISTORY  Family History   Problem Relation Age of Onset    Hypertension Father     Hypertension Mother     Diabetes  Maternal Grandmother     Lipids Maternal Grandmother     Hypertension Maternal Grandmother     Diabetes Maternal Grandfather     Lipids Maternal Grandfather     Hypertension Maternal Grandfather     Hypertension Sister     Hypertension Brother        ALLERGIES  Allergies   Allergen Reactions    Amlodipine HIVES    Diphenhydramine OTHER (SEE COMMENTS)     Reports severe dizziness the next day    Hydralazine OTHER (SEE COMMENTS)     dizziness    Pregabalin SWELLING     Swelling of legs, and states she made her stomach hurt    Gabapentin DIZZINESS and OTHER (SEE COMMENTS)    Aspirin OTHER (SEE COMMENTS)     heartburn    heartburn   heartburn    Carvedilol DIZZINESS and OTHER (SEE COMMENTS)    Hydrochlorothiazide RASH and ITCHING    Indapamide DIZZINESS and OTHER (SEE COMMENTS)    Levaquin [Levofloxacin Hemihydrate]      Upset stomach    Moxifloxacin OTHER (SEE COMMENTS)    Amoxicillin-Pot Clavulanate DIARRHEA     Side effect    Side effect   Side effect    Latex RASH    Latex RASH    Levofloxacin DIARRHEA     gastritis    Metoprolol ITCHING    Topiramate ITCHING       REVIEW OF SYSTEMS:   13-point review of systems was done and is negative unless otherwise stated in HPI.     PHYSICAL EXAM:   /75 (BP Location: Left arm, Patient Position: Sitting, Cuff Size: adult)   Pulse 81   Resp 16   Ht 60\"   Wt 185 lb (83.9 kg)   SpO2 99%   BMI 36.13 kg/m²   General appearance: Well appearing, alert and in no acute distress  Skin: skin color normal.  No rashes or lesions.    Head: Normocephalic, atraumatic.    Neurological:  Mental Status: Alert, oriented to situation/normal fund of knowledge, Follows commands, and Speech fluent and appropriate.  Cranial Nerves: PERRL, visual fields intact to confrontation, extraocular movements intact, facial sensation intact, face symmetric, no facial droop or ptosis, normal bedside auditory acuity, no dysarthria  Motor: muscle strength 5/5 both upper and lower extremities,  Reflexes:  UE and LE reflexes are equal and reactive  Sensation: intact to light touch  Coordination: Finger-to- nose-finger intact bilaterally   Gait: normal-based.      LABS/DATA:  Reviewed CBC and CMP       IMAGING:  CT brain  IMPRESSION:     No acute abnormality identified.       ASSESSMENT:  The patient is a 69 year old woman with past medical history of migraines, hypertension and hyperlipidemia, anxiety, diabetes who presents to establish care regarding migraines.   Her neurological examination is normal.      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      Prior medication trials: Topiramate, Sumatriptan, Fioricet, Verapamil, Irbesartan, Nortriptyline     Chronic migraine without aura   Medication overuse headache   -Preventative: Continue Verapamil.  Add Duloxetine, add Botox   Future considerations: CGRP antibody   No Propranolol due to Verapamil use (discussed)  -Abortive: Ubrelvy as needed. No triptans due to uncontrolled hypertension.  No Fioricet due to risk of MOH and interaction with Alprazolam.   No Tizanidine due to Flexeril use for her bursitis.  Can revisit this once off Flexeril.      I discussed if she is using Fioricet regularly she must taper off of it.     Future considerations: Nurtec    Limit non-CGRP rescue medications <2 times per week to avoid developing medication overuse headaches   -MOH - use Depakote at bedtime for 10 days - avoid further steroids due to hypertension.    -Neuroimaging: MRI brain, MRV brain recommended   -Yearly optometry/ophthalmology dilated eye exam for ocular health     -Follow up: 3 months   -Lifestyle information provided      INITIAL JUSTIFICATION:    Botox Authorization/Reauthorization Questionnaire:      Year of initial migraine onset?  Many years   Does patient have more than 15 headache days a month?  yes   If yes, how many days monthly?  30/30  Do headaches last 4 hours or more per day?  yes  Have headaches persisted with this  frequency for 3 months or more? yes  Are 8 of these 15 headaches migraines? yes     Any ER visits due to migraines?  yes  If yes, provide date see HPI  Disability due to headache?  Yes     List two different abortive medications patient has tried, noting dates used, dosing, and response to each (effective, intolerant, contraindicated or failed)  May include NSAIDs, steriods, triptans, narcotic pain relievers, OTC preparations    Prior medication trials: Topiramate, Sumatriptan, Fioricet, Verapamil, Irbesartan, Nortriptyline     List two different prophylactic medications from two different therapeutic drug classes that patient has tried, noting dates used, dosing, and response to each (effective, intolerant, contraindicated or failed)    Prior medication trials: Topiramate, Sumatriptan, Fioricet, Verapamil, Irbesartan, Nortriptyline     Explained prior authorization timeline and process, including that her insurance may require trial and failure of additional medication and, if approved, potentially her  purchase of the medication to be administered in the office.         Discussed indication, administration, dose, and side effects with patient of any medications personally prescribed. Patient was advised to let my office know if they have any questions or concerns.       Today, I personally spent 30 minutes in this case, including chart review, time spent with patient doing face to face evaluation w/ interview and exam and patient education, counseling, and time was spent in patient education, counseling, and coordination of care as described above.   Issues discussed: Diagnosis and implications on future health, benefits and side effects of present and future medications, test results as well as further testing and medications required.    This note was prepared using Dragon Medical voice recognition dictation software and as a result, errors may occur. When identified, these errors have been corrected. While  every attempt is made to correct errors during dictation, discrepancies may still exist    LATOYA Sutton DO   Staff Neurologist   7/22/2024  2:58 PM

## 2024-07-23 ENCOUNTER — TELEPHONE (OUTPATIENT)
Dept: PHYSICAL MEDICINE AND REHAB | Facility: CLINIC | Age: 69
End: 2024-07-23

## 2024-07-23 NOTE — TELEPHONE ENCOUNTER
Initiated authorization for Botox 200U CPT/[facility] , [address] dx:G43.711  with [employer]  Reference #[id number].  Clinicals have been faxed to [employer]  Status: Pending

## 2024-07-24 ENCOUNTER — TELEPHONE (OUTPATIENT)
Dept: NEUROLOGY | Facility: CLINIC | Age: 69
End: 2024-07-24

## 2024-07-24 ENCOUNTER — HOSPITAL ENCOUNTER (EMERGENCY)
Facility: HOSPITAL | Age: 69
Discharge: HOME | End: 2024-07-24
Attending: EMERGENCY MEDICINE
Payer: COMMERCIAL

## 2024-07-24 ENCOUNTER — APPOINTMENT (OUTPATIENT)
Dept: CT IMAGING | Facility: HOSPITAL | Age: 69
End: 2024-07-24
Attending: EMERGENCY MEDICINE
Payer: COMMERCIAL

## 2024-07-24 VITALS
OXYGEN SATURATION: 95 % | SYSTOLIC BLOOD PRESSURE: 161 MMHG | RESPIRATION RATE: 16 BRPM | DIASTOLIC BLOOD PRESSURE: 74 MMHG | HEART RATE: 70 BPM | TEMPERATURE: 99 F

## 2024-07-24 DIAGNOSIS — R10.13 EPIGASTRIC PAIN: Primary | ICD-10-CM

## 2024-07-24 LAB
ALBUMIN SERPL-MCNC: 4.6 G/DL (ref 3.2–4.8)
ALBUMIN/GLOB SERPL: 1.9 {RATIO} (ref 1–2)
ALP LIVER SERPL-CCNC: 112 U/L
ALT SERPL-CCNC: 11 U/L
ANION GAP SERPL CALC-SCNC: 5 MMOL/L (ref 0–18)
AST SERPL-CCNC: 12 U/L (ref ?–34)
ATRIAL RATE: 77 BPM
BASOPHILS # BLD AUTO: 0.05 X10(3) UL (ref 0–0.2)
BASOPHILS NFR BLD AUTO: 0.5 %
BILIRUB SERPL-MCNC: 0.3 MG/DL (ref 0.2–1.1)
BUN BLD-MCNC: 23 MG/DL (ref 9–23)
BUN/CREAT SERPL: 23.7 (ref 10–20)
CALCIUM BLD-MCNC: 10.4 MG/DL (ref 8.7–10.4)
CHLORIDE SERPL-SCNC: 99 MMOL/L (ref 98–112)
CO2 SERPL-SCNC: 26 MMOL/L (ref 21–32)
CREAT BLD-MCNC: 0.97 MG/DL
DEPRECATED RDW RBC AUTO: 42.8 FL (ref 35.1–46.3)
EGFRCR SERPLBLD CKD-EPI 2021: 62 ML/MIN/1.73M2 (ref 60–?)
EOSINOPHIL # BLD AUTO: 0.06 X10(3) UL (ref 0–0.7)
EOSINOPHIL NFR BLD AUTO: 0.5 %
ERYTHROCYTE [DISTWIDTH] IN BLOOD BY AUTOMATED COUNT: 13.2 % (ref 11–15)
GLOBULIN PLAS-MCNC: 2.4 G/DL (ref 2–3.5)
GLUCOSE BLD-MCNC: 137 MG/DL (ref 70–99)
HCT VFR BLD AUTO: 35.7 %
HGB BLD-MCNC: 12.3 G/DL
IMM GRANULOCYTES # BLD AUTO: 0.04 X10(3) UL (ref 0–1)
IMM GRANULOCYTES NFR BLD: 0.4 %
LIPASE SERPL-CCNC: 56 U/L (ref 12–53)
LYMPHOCYTES # BLD AUTO: 1.77 X10(3) UL (ref 1–4)
LYMPHOCYTES NFR BLD AUTO: 16.1 %
MCH RBC QN AUTO: 30.3 PG (ref 26–34)
MCHC RBC AUTO-ENTMCNC: 34.5 G/DL (ref 31–37)
MCV RBC AUTO: 87.9 FL
MONOCYTES # BLD AUTO: 0.89 X10(3) UL (ref 0.1–1)
MONOCYTES NFR BLD AUTO: 8.1 %
NEUTROPHILS # BLD AUTO: 8.19 X10 (3) UL (ref 1.5–7.7)
NEUTROPHILS # BLD AUTO: 8.19 X10(3) UL (ref 1.5–7.7)
NEUTROPHILS NFR BLD AUTO: 74.4 %
OSMOLALITY SERPL CALC.SUM OF ELEC: 276 MOSM/KG (ref 275–295)
P AXIS: 36 DEGREES
P-R INTERVAL: 154 MS
PLATELET # BLD AUTO: 357 10(3)UL (ref 150–450)
POTASSIUM SERPL-SCNC: 4.5 MMOL/L (ref 3.5–5.1)
PROT SERPL-MCNC: 7 G/DL (ref 5.7–8.2)
Q-T INTERVAL: 392 MS
QRS DURATION: 76 MS
QTC CALCULATION (BEZET): 443 MS
R AXIS: -31 DEGREES
RBC # BLD AUTO: 4.06 X10(6)UL
SODIUM SERPL-SCNC: 130 MMOL/L (ref 136–145)
T AXIS: 35 DEGREES
TROPONIN I SERPL HS-MCNC: 3 NG/L
VENTRICULAR RATE: 77 BPM
WBC # BLD AUTO: 11 X10(3) UL (ref 4–11)

## 2024-07-24 PROCEDURE — 85025 COMPLETE CBC W/AUTO DIFF WBC: CPT | Performed by: EMERGENCY MEDICINE

## 2024-07-24 PROCEDURE — 99285 EMERGENCY DEPT VISIT HI MDM: CPT

## 2024-07-24 PROCEDURE — 99284 EMERGENCY DEPT VISIT MOD MDM: CPT

## 2024-07-24 PROCEDURE — 93010 ELECTROCARDIOGRAM REPORT: CPT

## 2024-07-24 PROCEDURE — 96361 HYDRATE IV INFUSION ADD-ON: CPT

## 2024-07-24 PROCEDURE — 80053 COMPREHEN METABOLIC PANEL: CPT | Performed by: EMERGENCY MEDICINE

## 2024-07-24 PROCEDURE — 83690 ASSAY OF LIPASE: CPT | Performed by: EMERGENCY MEDICINE

## 2024-07-24 PROCEDURE — 96360 HYDRATION IV INFUSION INIT: CPT

## 2024-07-24 PROCEDURE — 93005 ELECTROCARDIOGRAM TRACING: CPT

## 2024-07-24 PROCEDURE — 84484 ASSAY OF TROPONIN QUANT: CPT | Performed by: EMERGENCY MEDICINE

## 2024-07-24 PROCEDURE — 74177 CT ABD & PELVIS W/CONTRAST: CPT | Performed by: EMERGENCY MEDICINE

## 2024-07-24 RX ORDER — SUCRALFATE ORAL 1 G/10ML
1 SUSPENSION ORAL ONCE
Status: COMPLETED | OUTPATIENT
Start: 2024-07-24 | End: 2024-07-24

## 2024-07-24 RX ORDER — MORPHINE SULFATE 4 MG/ML
4 INJECTION, SOLUTION INTRAMUSCULAR; INTRAVENOUS ONCE
Status: DISCONTINUED | OUTPATIENT
Start: 2024-07-24 | End: 2024-07-24

## 2024-07-24 RX ORDER — SUCRALFATE ORAL 1 G/10ML
1 SUSPENSION ORAL
Qty: 414 ML | Refills: 0 | Status: SHIPPED | OUTPATIENT
Start: 2024-07-24 | End: 2024-08-07

## 2024-07-24 NOTE — CM/SW NOTE
Follow up   Dr Lori Smith  [phone]    Called for sooner appointment    Soonest appointment is for July 31 at 1pm  [place] location  [address] [facility], [address]  [address]    Message left for patient

## 2024-07-24 NOTE — TELEPHONE ENCOUNTER
Pt called in again advised that duloxetine and ubrelvy have been causing her to be very gassy, advised she had to go to ed today regarding this matter. Pt also advised that she cannot  divalproex swallow the medication. Looking to speak to clinical team. Pls advise.

## 2024-07-24 NOTE — ED PROVIDER NOTES
Patient Seen in: [facility] Emergency Department      History     Chief Complaint   Patient presents with    Back Pain     Stated Complaint: gas and back pain    Subjective:   HPI    69 year old female with past medical history of diabetes, migraines, hyperlipidemia, hypertension who presents with pain radiating across epigastric area, associated with belching, started last night.  She took Pepcid and Tums at home with no relief, then took Mylanta with some relief and had a bowel movement, but still has pain. She has GI appt next month for the excessive belching, but states this pain was not significant until now. She denies: sob, cp, pain on deep breathing, vomiting, diarrhea, constipation, blood in stool.       Objective:   Past Medical History:    Bacterial overgrowth syndrome    positive by breath test: both hydrogen and methane criteria.    Diabetes (HCC)    Migraines    Other and unspecified hyperlipidemia    Unspecified essential hypertension              Past Surgical History:   Procedure Laterality Date    Colonoscopy      Excisional biopsy left  2008    Hernia surgery      umbilical hernia surgery    Hysterectomy      in 30's, uterine prolapse    Other surgical history  2012    left breast biopsy--normal/benign findings    Other surgical history  2012    right knee arthroscopy                Social History     Socioeconomic History    Marital status:    Tobacco Use    Smoking status: Never     Passive exposure: Never    Smokeless tobacco: Never   Vaping Use    Vaping status: Never Used   Substance and Sexual Activity    Alcohol use: No     Alcohol/week: 0.0 standard drinks of alcohol    Drug use: No     Social Determinants of Health      Received from [facility]              Review of Systems    Positive for stated Chief Complaint: Back Pain    Other systems are as noted in HPI.  Constitutional and vital signs reviewed.      All other systems reviewed and negative except as  noted above.    Physical Exam     ED Triage Vitals [07/24/24 0434]   BP (!) 174/65   Pulse 81   Resp 18   Temp 98.7 °F (37.1 °C)   Temp src Temporal   SpO2 98 %   O2 Device None (Room air)       Current Vitals:   No data recorded          Physical Exam  Vitals and nursing note reviewed.   Constitutional:       General: She is not in acute distress.     Appearance: Normal appearance. She is well-developed. She is obese. She is not ill-appearing, toxic-appearing or diaphoretic.   HENT:      Head: Normocephalic and atraumatic.   Eyes:      Conjunctiva/sclera: Conjunctivae normal.      Pupils: Pupils are equal, round, and reactive to light.   Cardiovascular:      Rate and Rhythm: Normal rate and regular rhythm.      Pulses: Normal pulses.      Heart sounds: Normal heart sounds. No murmur heard.  Pulmonary:      Effort: Pulmonary effort is normal. No respiratory distress.      Breath sounds: Normal breath sounds. No wheezing.   Abdominal:      General: There is no distension.      Palpations: Abdomen is soft.      Tenderness: There is no abdominal tenderness. There is no guarding.      Comments: No significant reproducible abd pain   Musculoskeletal:         General: No tenderness. Normal range of motion.      Cervical back: Full passive range of motion without pain, normal range of motion and neck supple. No rigidity. Normal range of motion.      Right lower leg: No edema.      Left lower leg: No edema.   Skin:     General: Skin is warm and dry.      Findings: No rash.   Neurological:      Mental Status: She is alert and oriented to person, place, and time.      GCS: GCS eye subscore is 4. GCS verbal subscore is 5. GCS motor subscore is 6.      Sensory: Sensation is intact. No sensory deficit.      Motor: Motor function is intact. No weakness.   Psychiatric:         Attention and Perception: Attention normal.         Mood and Affect: Mood normal.         Behavior: Behavior normal. Behavior is cooperative.             ED  Course     Labs Reviewed   COMP METABOLIC PANEL (14) - Abnormal; Notable for the following components:       Result Value    Glucose 137 (*)     Sodium 130 (*)     BUN/CREA Ratio 23.7 (*)     All other components within normal limits   LIPASE - Abnormal; Notable for the following components:    Lipase 56 (*)     All other components within normal limits   CBC W/ DIFFERENTIAL - Abnormal; Notable for the following components:    Neutrophil Absolute Prelim 8.19 (*)     Neutrophil Absolute 8.19 (*)     All other components within normal limits   TROPONIN I HIGH SENSITIVITY - Normal   CBC WITH DIFFERENTIAL WITH PLATELET    Narrative:     The following orders were created for panel order CBC With Differential With Platelet.  Procedure                               Abnormality         Status                     ---------                               -----------         ------                     CBC W/ DIFFERENTIAL[[id number]]          Abnormal            Final result                 Please view results for these tests on the individual orders.     EKG    Rate, intervals and axes as noted on EKG Report.  Rate: 77  Rhythm: Sinus Rhythm  Reading: sinus rhythm             MDM      Pulse Ox: 98%, Normal, RA    Cardiac Monitor:   Pulse Readings from Last 1 Encounters:   07/24/24 70   , sinus, normal for rate and rhythm     Radiology findings:     CT ABDOMEN+PELVIS(CONTRAST ONLY)(CPT=74177)    Result Date: 7/24/2024  CONCLUSION:   Diverticulosis without diverticulitis.  Mild wall thickening of the distal esophagus at the gastroesophageal junction may be related to esophagitis. If clinically indicated, further evaluation with direct visualization could be performed.  Hepatic steatosis.  Punctate focus of gas within the bladder is nonspecific.  Correlation with urinalysis and prior intervention recommended.  If there has been no recent intervention, then a urinary tract infection would be the leading differential.    Dictated by  (CST): Fabrizio Asher MD on 7/24/2024 at 7:14 AM     Finalized by (CST): Fabrizio Asher MD on 7/24/2024 at 7:21 AM               Medications   sodium chloride 0.9 % IV bolus 1,000 mL (0 mL Intravenous Stopped 7/24/24 0936)   iopamidol 76% (ISOVUE-370) injection for power injector (80 mL Intravenous Given 7/24/24 0659)   sucralfate (Carafate) 1 GM/10ML oral suspension 1 g (1 g Oral Given 7/24/24 0834)   mylanta-dicyclomine-lidocaine 2% (G.I. Cocktail) oral liquid ( Oral Given 7/24/24 0835)       Pt with epigastric pain starting tonight. No vomiting or diarrhea in ER. CT Abd/Pel with  no significant concerning findings. Trop/EKG with no acute concerning results. Pt advised of supportive care and keeping f/u with GI. I notified  to attempt to get pt earlier appt. Return precautions discussed.       Disposition and Plan     Clinical Impression:  1. Epigastric pain         Disposition:  Discharge  7/24/2024  9:34 am    Follow-up:  Avtar Mccoy,   [address] [facility] [address] [phone]    Schedule an appointment as soon as possible for a visit  Call for next available appointment    We recommend that you schedule follow up care with a primary care provider within the next three months to obtain basic health screening including reassessment of your blood pressure.      Medications Prescribed:  Discharge Medication List as of 7/24/2024  9:36 AM        START taking these medications    Details   !! sucralfate 1 GM/10ML Oral Suspension Take 10 mL (1 g total) by mouth 4 (four) times daily before meals and nightly for 14 days., Normal, Disp-414 mL, R-0       !! - Potential duplicate medications found. Please discuss with provider.

## 2024-07-24 NOTE — ED INITIAL ASSESSMENT (HPI)
Patient arrives through triage with complaint of mid/upper back pain. Patient states it started last night at 1900. Patient states that she has been gassy starting at the same time. Patient states she took mylanta, pepcid and gas-x with no relief. Patient states she had epigastric pain that comes and goes but is not currently experiencing this. Patient denies NVD, denies dizziness.

## 2024-07-25 NOTE — TELEPHONE ENCOUNTER
Pt called back asking to speak with the nurse again. She said please call her house number she doesn't answer her mobile number

## 2024-07-26 NOTE — TELEPHONE ENCOUNTER
LOV 07/22/24    Phone call returned to patient. Patient states that the Ubrelvy is not doing anything for her migraines. Pt woke up Thursday with nausea, dizziness from her Migraine. Patient states that her duloxetine is not helping with her headaches either. Divalproex, she did not get this medication as it is too bulky for her to swallow. Pt would like to know if the provider can prescribe something else.

## 2024-07-29 NOTE — TELEPHONE ENCOUNTER
Phone call returned to pt. Pt would like oral liquid form of depakote. Message sent to the provider.

## 2024-08-09 ENCOUNTER — MED REC SCAN ONLY (OUTPATIENT)
Dept: NEUROLOGY | Facility: CLINIC | Age: 69
End: 2024-08-09

## 2024-08-19 ENCOUNTER — TELEPHONE (OUTPATIENT)
Dept: NEUROLOGY | Facility: CLINIC | Age: 69
End: 2024-08-19

## 2024-08-19 NOTE — TELEPHONE ENCOUNTER
Called patient back while on the phone patient was coughing uncontrollably. She states has a virus that her doctor told her will last about 10 days.Pt will call back in 10 days to reschedule her Botox appointment.    PSR- Please cancel patients appointment for 08/21/24.    Pt called and states that she has sore throat and is sneezing she would like to know if she can still come in and get her Botox?Pt state she has completed a COVID test and its not COVID. Should she still come in for her appointment on 8/21/24.

## 2024-08-19 NOTE — TELEPHONE ENCOUNTER
Pt has upcoming visit on 8/21 for a botox injection, but she stated she's not feeling well. Its not covid she stated but wanted to know if she's ok to still come in for a visit

## 2024-08-28 ENCOUNTER — PATIENT MESSAGE (OUTPATIENT)
Dept: NEUROLOGY | Facility: CLINIC | Age: 69
End: 2024-08-28

## 2024-09-18 ENCOUNTER — TELEPHONE (OUTPATIENT)
Dept: NEUROLOGY | Facility: CLINIC | Age: 69
End: 2024-09-18

## 2024-09-18 NOTE — TELEPHONE ENCOUNTER
Pt called in regarding getting appt for botox set up. Advised she called in on Monday regarding botox but nothing was schedule. Pt schedule to come in 9/20 @ 1130 for botox at [place]. Pt advised she get gel injection for arthritis over at [facility] and wants to confirm if its okay to proceed to get botox. Pt requesting cb from clinical prior to appt 9/20 . Pls advise.

## 2024-09-20 ENCOUNTER — OFFICE VISIT (OUTPATIENT)
Dept: NEUROLOGY | Facility: CLINIC | Age: 69
End: 2024-09-20
Payer: COMMERCIAL

## 2024-09-20 ENCOUNTER — MED REC SCAN ONLY (OUTPATIENT)
Dept: NEUROLOGY | Facility: CLINIC | Age: 69
End: 2024-09-20

## 2024-09-20 DIAGNOSIS — G43.711 INTRACTABLE CHRONIC MIGRAINE WITHOUT AURA AND WITH STATUS MIGRAINOSUS: Primary | ICD-10-CM

## 2024-09-20 PROCEDURE — 64615 CHEMODENERV MUSC MIGRAINE: CPT | Performed by: OTHER

## 2024-09-20 NOTE — PROGRESS NOTES
Paperwork noting that patient may bear financial responsibility for procedure(s) performed in clinic today signed prior to proceeding with procedure(s).    Furthermore, patient notified that they should contact their insurer to verify that your procedure/test has been approved and is a COVERED benefit.  Although the [facility] staff does its due diligence, the insurance carrier gives the disclaimer that \"Although the procedure is authorized, this does not guarantee payment.\"    Ultimately the patient is responsible for payment.    Botox is:  [x] Buy and Bill  [] Patient Supplied      New start    [x] I have discussed with patient that if their insurance changes they must contact the office right away with that information so that a new prior authorization can be completed.  Patient verbalized understanding that Botox cannot be performed without a current prior authorization in place with correct insurance.

## 2024-09-25 ENCOUNTER — TELEPHONE (OUTPATIENT)
Dept: NEUROLOGY | Facility: CLINIC | Age: 69
End: 2024-09-25

## 2024-09-25 NOTE — TELEPHONE ENCOUNTER
Pt called in offered October 10 th decline other soonest appt isn't until mid dec. Is looking to speak to clinical team advised is having bad headaches, blood pressure was at 180 and is dizzy. Pls advise.

## 2024-09-26 ENCOUNTER — APPOINTMENT (OUTPATIENT)
Dept: GENERAL RADIOLOGY | Facility: HOSPITAL | Age: 69
End: 2024-09-26
Attending: EMERGENCY MEDICINE
Payer: COMMERCIAL

## 2024-09-26 ENCOUNTER — HOSPITAL ENCOUNTER (EMERGENCY)
Facility: HOSPITAL | Age: 69
Discharge: HOME | End: 2024-09-26
Attending: EMERGENCY MEDICINE
Payer: COMMERCIAL

## 2024-09-26 VITALS
OXYGEN SATURATION: 98 % | RESPIRATION RATE: 16 BRPM | SYSTOLIC BLOOD PRESSURE: 149 MMHG | HEART RATE: 79 BPM | TEMPERATURE: 98 F | WEIGHT: 185 LBS | DIASTOLIC BLOOD PRESSURE: 71 MMHG | HEIGHT: 64 IN | BODY MASS INDEX: 31.58 KG/M2

## 2024-09-26 DIAGNOSIS — R51.9 NONINTRACTABLE HEADACHE, UNSPECIFIED CHRONICITY PATTERN, UNSPECIFIED HEADACHE TYPE: Primary | ICD-10-CM

## 2024-09-26 LAB
ANION GAP SERPL CALC-SCNC: 9 MMOL/L (ref 0–18)
BASOPHILS # BLD AUTO: 0.03 X10(3) UL (ref 0–0.2)
BASOPHILS NFR BLD AUTO: 0.4 %
BUN BLD-MCNC: 14 MG/DL (ref 9–23)
BUN/CREAT SERPL: 15.2 (ref 10–20)
CALCIUM BLD-MCNC: 10.1 MG/DL (ref 8.7–10.4)
CHLORIDE SERPL-SCNC: 102 MMOL/L (ref 98–112)
CO2 SERPL-SCNC: 23 MMOL/L (ref 21–32)
CREAT BLD-MCNC: 0.92 MG/DL
DEPRECATED RDW RBC AUTO: 43.1 FL (ref 35.1–46.3)
EGFRCR SERPLBLD CKD-EPI 2021: 67 ML/MIN/1.73M2 (ref 60–?)
EOSINOPHIL # BLD AUTO: 0.15 X10(3) UL (ref 0–0.7)
EOSINOPHIL NFR BLD AUTO: 1.8 %
ERYTHROCYTE [DISTWIDTH] IN BLOOD BY AUTOMATED COUNT: 13.3 % (ref 11–15)
GLUCOSE BLD-MCNC: 112 MG/DL (ref 70–99)
GLUCOSE BLDC GLUCOMTR-MCNC: 136 MG/DL (ref 70–99)
HCT VFR BLD AUTO: 36.7 %
HGB BLD-MCNC: 12.7 G/DL
IMM GRANULOCYTES # BLD AUTO: 0.02 X10(3) UL (ref 0–1)
IMM GRANULOCYTES NFR BLD: 0.2 %
LYMPHOCYTES # BLD AUTO: 1.8 X10(3) UL (ref 1–4)
LYMPHOCYTES NFR BLD AUTO: 21.5 %
MCH RBC QN AUTO: 30.5 PG (ref 26–34)
MCHC RBC AUTO-ENTMCNC: 34.6 G/DL (ref 31–37)
MCV RBC AUTO: 88 FL
MONOCYTES # BLD AUTO: 0.67 X10(3) UL (ref 0.1–1)
MONOCYTES NFR BLD AUTO: 8 %
NEUTROPHILS # BLD AUTO: 5.71 X10 (3) UL (ref 1.5–7.7)
NEUTROPHILS # BLD AUTO: 5.71 X10(3) UL (ref 1.5–7.7)
NEUTROPHILS NFR BLD AUTO: 68.1 %
OSMOLALITY SERPL CALC.SUM OF ELEC: 279 MOSM/KG (ref 275–295)
PLATELET # BLD AUTO: 381 10(3)UL (ref 150–450)
POTASSIUM SERPL-SCNC: 4.3 MMOL/L (ref 3.5–5.1)
RBC # BLD AUTO: 4.17 X10(6)UL
SODIUM SERPL-SCNC: 134 MMOL/L (ref 136–145)
TROPONIN I SERPL HS-MCNC: 3 NG/L
WBC # BLD AUTO: 8.4 X10(3) UL (ref 4–11)

## 2024-09-26 PROCEDURE — 80048 BASIC METABOLIC PNL TOTAL CA: CPT | Performed by: EMERGENCY MEDICINE

## 2024-09-26 PROCEDURE — 99285 EMERGENCY DEPT VISIT HI MDM: CPT

## 2024-09-26 PROCEDURE — 99284 EMERGENCY DEPT VISIT MOD MDM: CPT

## 2024-09-26 PROCEDURE — 82962 GLUCOSE BLOOD TEST: CPT

## 2024-09-26 PROCEDURE — 93005 ELECTROCARDIOGRAM TRACING: CPT

## 2024-09-26 PROCEDURE — 93010 ELECTROCARDIOGRAM REPORT: CPT

## 2024-09-26 PROCEDURE — 71045 X-RAY EXAM CHEST 1 VIEW: CPT | Performed by: EMERGENCY MEDICINE

## 2024-09-26 PROCEDURE — 84484 ASSAY OF TROPONIN QUANT: CPT | Performed by: EMERGENCY MEDICINE

## 2024-09-26 PROCEDURE — 85025 COMPLETE CBC W/AUTO DIFF WBC: CPT

## 2024-09-26 PROCEDURE — 96374 THER/PROPH/DIAG INJ IV PUSH: CPT

## 2024-09-26 PROCEDURE — 80048 BASIC METABOLIC PNL TOTAL CA: CPT

## 2024-09-26 PROCEDURE — 85025 COMPLETE CBC W/AUTO DIFF WBC: CPT | Performed by: EMERGENCY MEDICINE

## 2024-09-26 RX ORDER — METOCLOPRAMIDE 10 MG/1
5 TABLET ORAL NIGHTLY PRN
Qty: 5 TABLET | Refills: 0 | Status: SHIPPED | OUTPATIENT
Start: 2024-09-26 | End: 2024-10-01

## 2024-09-26 RX ORDER — ACETAMINOPHEN 500 MG
1000 TABLET ORAL ONCE
Status: COMPLETED | OUTPATIENT
Start: 2024-09-26 | End: 2024-09-26

## 2024-09-26 RX ORDER — METOCLOPRAMIDE HYDROCHLORIDE 5 MG/ML
5 INJECTION INTRAMUSCULAR; INTRAVENOUS ONCE
Status: COMPLETED | OUTPATIENT
Start: 2024-09-26 | End: 2024-09-26

## 2024-09-26 RX ORDER — BUTALBITAL, ACETAMINOPHEN AND CAFFEINE 50; 325; 40 MG/1; MG/1; MG/1
1 TABLET ORAL EVERY 6 HOURS PRN
Qty: 5 TABLET | Refills: 0 | Status: SHIPPED | OUTPATIENT
Start: 2024-09-26 | End: 2024-10-01

## 2024-09-26 NOTE — TELEPHONE ENCOUNTER
Phone call received from pt. Pt states that she has had a headache for the last 8 months. Pt states that she was told by the pharmacist that she does not have migraines, because none of the medications that she is taking are not working. Pt states that the headache is on her right side of her forehead and head. She states that the fiorocet is the only medication that she takes that helps her a little bit, and she is out of this. Pt states that the Botox injections made her worse. She states that her pain is a 9-10/10. Pt states that she believes that this is nerve pain, and would like medication for this. Patient is dizzy. RN advised pt that she should be seen in the ED, pt states that she will go, but she doesn't feel like they do anything to help her.    Problem: Nutrition/Hydration-ADULT  Goal: Nutrient/Hydration intake appropriate for improving, restoring or maintaining nutritional needs  Monitor and assess patient's nutrition/hydration status for malnutrition (ex- brittle hair, bruises, dry skin, pale skin and conjunctiva, muscle wasting, smooth red tongue, and disorientation)  Collaborate with interdisciplinary team and initiate plan and interventions as ordered  Monitor patient's weight and dietary intake as ordered or per policy  Utilize nutrition screening tool and intervene per policy  Determine patient's food preferences and provide high-protein, high-caloric foods as appropriate       INTERVENTIONS:  - Monitor oral intake, urinary output, labs, and treatment plans  - Assess nutrition and hydration status and recommend course of action  - Evaluate amount of meals eaten  - Assist patient with eating if necessary   - Allow adequate time for meals  - Recommend/ encourage appropriate diets, oral nutritional supplements, and vitamin/mineral supplements  - Order, calculate, and assess calorie counts as needed  - Recommend, monitor, and adjust tube feedings and TPN/PPN based on assessed needs  - Assess need for intravenous fluids  - Provide specific nutrition/hydration education as appropriate  - Include patient/family/caregiver in decisions related to nutrition   Outcome: Not Progressing

## 2024-09-26 NOTE — ED INITIAL ASSESSMENT (HPI)
Patient presents to ER with complaints of headache, and dizziness notes pain down into neck and shoulder.  Patient notes hx of migraine, but notes this feels more intense + nauseas, no relief with previous prescribed medications.   Headache since Saturday.     - BEFAST in triage.

## 2024-09-27 LAB
ATRIAL RATE: 73 BPM
P AXIS: 56 DEGREES
P-R INTERVAL: 166 MS
Q-T INTERVAL: 418 MS
QRS DURATION: 72 MS
QTC CALCULATION (BEZET): 460 MS
R AXIS: -29 DEGREES
T AXIS: 44 DEGREES
VENTRICULAR RATE: 73 BPM

## 2024-09-27 NOTE — TELEPHONE ENCOUNTER
Spoke with pt, given her a sooner appt   Next Appt: With Neurology (Awa Sutton DO)  10/08/2024 at 10:20 AM

## 2024-09-27 NOTE — ED PROVIDER NOTES
[facility]  Emergency Department Attending Note     Chief Complaint:   Chief Complaint   Patient presents with    Headache    Dizziness     HISTORY OF PRESENT ILLNESS:   Allie Mae is a 69 year old female who presents to the ED with a past medical history including chronic migraine headaches presents for her usual headache today.  Frontal, sometimes more on the right not associated with vision change.  Denies nausea vomiting.  Denies any chest pain or dyspnea pleuritic or exertional plaints.  States that sometimes the pain is to the left side of her neck.  Not today.  Denies fever chills.  Denies neck stiffness.     MEDICAL & SOCIAL HISTORY:   Past Medical History:    Bacterial overgrowth syndrome    positive by breath test: both hydrogen and methane criteria.    Diabetes (HCC)    Migraines    Other and unspecified hyperlipidemia    Unspecified essential hypertension      Past Surgical History:   Procedure Laterality Date    Colonoscopy      Excisional biopsy left  2008    Hernia surgery      umbilical hernia surgery    Hysterectomy      in 30's, uterine prolapse    Other surgical history  2012    left breast biopsy--normal/benign findings    Other surgical history  2012    right knee arthroscopy      Social History     Socioeconomic History    Marital status:    Tobacco Use    Smoking status: Never     Passive exposure: Never    Smokeless tobacco: Never   Vaping Use    Vaping status: Never Used   Substance and Sexual Activity    Alcohol use: No     Alcohol/week: 0.0 standard drinks of alcohol    Drug use: No     Social Determinants of Health      Received from [facility]      Allergies   Allergen Reactions    Amlodipine HIVES    Diphenhydramine OTHER (SEE COMMENTS)     Reports severe dizziness the next day    Hydralazine OTHER (SEE COMMENTS)     dizziness    Pregabalin SWELLING     Swelling of legs, and states she made her stomach hurt    Gabapentin DIZZINESS and OTHER (SEE  COMMENTS)    Aspirin OTHER (SEE COMMENTS)     heartburn    heartburn   heartburn    Carvedilol DIZZINESS and OTHER (SEE COMMENTS)    Hydrochlorothiazide RASH and ITCHING    Indapamide DIZZINESS and OTHER (SEE COMMENTS)    Levaquin [Levofloxacin Hemihydrate]      Upset stomach    Moxifloxacin OTHER (SEE COMMENTS)    Amoxicillin-Pot Clavulanate DIARRHEA     Side effect    Side effect   Side effect    Latex RASH    Latex RASH    Levofloxacin DIARRHEA     gastritis    Metoprolol ITCHING    Topiramate ITCHING      Current Outpatient Medications   Medication Sig Dispense Refill    metoclopramide 10 MG Oral Tab Take 0.5 tablets (5 mg total) by mouth nightly as needed (headache). 5 tablet 0    ALPRAZolam 0.25 MG Oral Tab       Butalbital-APAP-Caffeine -40 MG Oral Cap TAKE 1 CAPSULE BY MOUTH ONSET OF HEADACHE. NO MORE THAN 1 CAPSULE IN 24 HOURS      clobetasol 0.05 % External Solution Apply to affected area on scalp twice daily x 2-3 weeks as directed      clotrimazole 10 MG Mouth/Throat Laina DISSOLVE 1 LOZENGE BY MOUTH THREE TIMES DAILY AS NEEDED      dexamethasone (DECADRON) 4 MG tablet       diazePAM 2 MG Oral Tab       diclofenac 50 MG Oral Tab EC       doxycycline 100 MG Oral Cap       famotidine 40 MG Oral Tab Take 1 tablet (40 mg total) by mouth 2 (two) times daily.      fluconazole 150 MG Oral Tab       fluticasone propionate 50 MCG/ACT Nasal Suspension 2 sprays by Nasal route daily.      gabapentin 100 MG Oral Cap Take 1 capsule (100 mg total) by mouth 3 (three) times daily.      loratadine 10 MG Oral Tab Take 1 tablet (10 mg total) by mouth daily.      Olmesartan Medoxomil 40 MG Oral Tab       ondansetron 4 MG Oral Tablet Dispersible Take 1 tablet (4 mg total) by mouth every 8 (eight) hours as needed.      pantoprazole 40 MG Oral Tab EC Take 1 tablet (40 mg total) by mouth 2 (two) times daily.      propranolol 20 MG Oral Tab Take 1 tablet (20 mg total) by mouth 2 (two) times daily.      sucralfate 1 GM/10ML  Oral Suspension       hydrALAZINE 50 MG Oral Tab Take 1 tablet (50 mg total) by mouth 3 (three) times daily.      DULoxetine 30 MG Oral Cap DR Particles Take 1 capsule (30 mg total) by mouth daily. 90 capsule 0    ubrogepant (UBRELVY) 100 MG Oral Tab Take one tablet at onset of migraine.  May take additional tablet in 2 hours if needed.  Do not exceed two tablets per 24 hour period. 10 tablet 0    hydrALAZINE 50 MG Oral Tab Take 1 tablet (50 mg total) by mouth 3 (three) times daily.      simvastatin 40 MG Oral Tab Take 1 tablet (40 mg total) by mouth daily. 90 tablet 1    azithromycin (ZITHROMAX Z-ELISEO) 250 MG Oral Tab Take two tablets today, then one tablet daily for 4 more days (Patient not taking: Reported on 6/7/2024) 6 tablet 0    Probiotic Product (PROBIOTIC DAILY OR) Take by mouth.      verapamil 120 MG Oral Tab TAKE 1 TABLET BY MOUTH TWICE DAILY 180 tablet 1    Esomeprazole Magnesium 20 MG Oral Capsule Delayed Release Take 1 capsule (20 mg total) by mouth 2 (two) times a day. Before meal 120 capsule 0    IRBESARTAN 150 MG Oral Tab TAKE 1 TABLET BY MOUTH TWICE DAILY 180 tablet 1    METFORMIN 500 MG Oral Tab TAKE 1 TABLET(500 MG) BY MOUTH TWICE DAILY WITH MEALS 180 tablet 1    Azelastine HCl 0.1 % Nasal Solution 1 spray by Nasal route 2 (two) times daily. 1 each 3    levothyroxine 50 MCG Oral Tab Take 1 tablet (50 mcg total) by mouth every morning before breakfast. 90 tablet 1    saccharomyces boulardii 250 MG Oral Cap Take 1 capsule (250 mg total) by mouth 2 (two) times daily. (Patient not taking: Reported on 6/7/2024) 60 capsule 1    Levocetirizine Dihydrochloride 5 MG Oral Tab Take 1 tablet (5 mg total) by mouth every evening. 90 tablet 0    Multiple Vitamins-Minerals (ICAPS AREDS 2 OR) Take 2 Scoops by mouth. 2 scoops at night (9.3gram) (Patient not taking: Reported on 6/7/2024)      cycloSPORINE (RESTASIS) 0.05 % Ophthalmic Emulsion Place 1 drop into both eyes 2 (two) times daily. 180 each 3    Glucose  Blood (ACCU-CHEK CARIE PLUS) In Vitro Strip Test two times daily. 100 strip 3          REVIEW OF SYSTEMS   A 10 point review of systems was completed and is negative except as listed in history of present illness      PHYSICAL EXAM   Vitals: /71   Pulse 79   Temp 98.4 °F (36.9 °C)   Resp 16   Ht 162.6 cm (5' 4\")   Wt 83.9 kg   SpO2 98%   BMI 31.76 kg/m²   /71   Pulse 79   Temp 98.4 °F (36.9 °C)   Resp 16   Ht 162.6 cm (5' 4\")   Wt 83.9 kg   SpO2 98%   BMI 31.76 kg/m²     General: A&Ox3, NAD  Constitutional: Well developed, well nourished, nontoxic  Head: atraumatic, normocephalic   Eyes: conjuctiva clear, no icterus, PERRL, EOMI, vision grossly normal  Ears: normal external appearance, no drainage  Nose:  Atraumatic, no swelling, no drainage, nares patent  Throat:  Moist pink mucous membranes, airway is patent  Neck:  Soft supple, no masses, no tracheal deviation, no stridor  Chest:  No bruising or abrasions, no tenderness, no deformity  Cardiac:  Regular rate and rhythm, no murmurs rubs or gallops.  Lung:  No distress, no retractions. Clear to auscultation bilaterally, no w/r/r  Abdomen:  Soft, nontender, nondistended, normal BS  Back: No stepoff/deformity  Extremities: FROM all ext, no deformities, intact equal peripheral pulses, no cyanosis or edema  Neuro: No facial droop, no slurred speech, moving all extremities freely, SILT to the bilateral upper and lower extremities  Psych: A&Ox3, normal affect, cooperative, calm  Skin: No rash, no petechiae/purpura, warm, dry      RESULTS  LABS:   Results for orders placed or performed during the hospital encounter of 09/26/24   CBC With Differential With Platelet   Result Value Ref Range    WBC 8.4 4.0 - 11.0 x10(3) uL    RBC 4.17 3.80 - 5.30 x10(6)uL    HGB 12.7 12.0 - 16.0 g/dL    HCT 36.7 35.0 - 48.0 %    MCV 88.0 80.0 - 100.0 fL    MCH 30.5 26.0 - 34.0 pg    MCHC 34.6 31.0 - 37.0 g/dL    RDW-SD 43.1 35.1 - 46.3 fL    RDW 13.3 11.0 - 15.0 %     .0 150.0 - 450.0 10(3)uL    Neutrophil Absolute Prelim 5.71 1.50 - 7.70 x10 (3) uL    Neutrophil Absolute 5.71 1.50 - 7.70 x10(3) uL    Lymphocyte Absolute 1.80 1.00 - 4.00 x10(3) uL    Monocyte Absolute 0.67 0.10 - 1.00 x10(3) uL    Eosinophil Absolute 0.15 0.00 - 0.70 x10(3) uL    Basophil Absolute 0.03 0.00 - 0.20 x10(3) uL    Immature Granulocyte Absolute 0.02 0.00 - 1.00 x10(3) uL    Neutrophil % 68.1 %    Lymphocyte % 21.5 %    Monocyte % 8.0 %    Eosinophil % 1.8 %    Basophil % 0.4 %    Immature Granulocyte % 0.2 %   Basic Metabolic Panel (8)   Result Value Ref Range    Glucose 112 (H) 70 - 99 mg/dL    Sodium 134 (L) 136 - 145 mmol/L    Potassium 4.3 3.5 - 5.1 mmol/L    Chloride 102 98 - 112 mmol/L    CO2 23.0 21.0 - 32.0 mmol/L    Anion Gap 9 0 - 18 mmol/L    BUN 14 9 - 23 mg/dL    Creatinine 0.92 0.55 - 1.02 mg/dL    BUN/CREA Ratio 15.2 10.0 - 20.0    Calcium, Total 10.1 8.7 - 10.4 mg/dL    Calculated Osmolality 279 275 - 295 mOsm/kg    eGFR-Cr 67 >=60 mL/min/1.73m2   Troponin I (High Sensitivity)   Result Value Ref Range    Troponin I (High Sensitivity) 3 <=34 ng/L         IMAGING: XR CHEST AP PORTABLE  (CPT=71045)    Result Date: 9/26/2024  CONCLUSION:  Scattered atelectasis/scarring without radiographically evident acute intrathoracic process.    Dictated by (CST): Audi Gutierrez MD on 9/26/2024 at 7:56 PM     Finalized by (CST): Audi Gutierrez MD on 9/26/2024 at 7:57 PM           I personally reviewed the radiology study and my finding were no acute intrathoracic process      Procedures:   Procedures    EKG: Sinus rhythm with a normal rate, normal intervals, no QRS, nonspecific ST-T wave changes without overt signs of acute ischemia, no old immediately available for comparison rate 73     ED COURSE          Re-Evaluation: Improved      Disposition & Plan:   Clinical Impression/Final Diagnosis:   1. Nonintractable headache, unspecified chronicity pattern, unspecified headache type         Medical Decision Making: Pt presents with HA that is identical to prior headaches for this patient.  The headache has no clinical features of ICH (namely SAH), specifically there was no sudden onset, is not the worst headache of life and occurred at rest not with exertion.  There are also no features of meningitis or encephalitis- no fever/nuchal rigidity or meningismus and no altered mental status.  Given the appearance of a benign headache the patient was treated symptomatically and instructed to follow up with the PCP for re-evaluation.      Medical Decision Making  Amount and/or Complexity of Data Reviewed  Independent Historian: spouse  External Data Reviewed: notes.  Labs: ordered. Decision-making details documented in ED Course.  Radiology: ordered and independent interpretation performed. Decision-making details documented in ED Course.  ECG/medicine tests: ordered and independent interpretation performed. Decision-making details documented in ED Course.    Risk  OTC drugs.        Disposition: Discharge  There are no disposition comments on file for this visit.     This note was generated in part using voice recognition dictation technology. The report was reviewed by this physician but still may have unintentional errors due to inherent limitations of voice recognition technology. All times are estimates.

## 2024-10-08 ENCOUNTER — OFFICE VISIT (OUTPATIENT)
Dept: NEUROLOGY | Facility: CLINIC | Age: 69
End: 2024-10-08
Payer: COMMERCIAL

## 2024-10-08 ENCOUNTER — TELEPHONE (OUTPATIENT)
Dept: PHYSICAL MEDICINE AND REHAB | Facility: CLINIC | Age: 69
End: 2024-10-08

## 2024-10-08 VITALS
SYSTOLIC BLOOD PRESSURE: 150 MMHG | DIASTOLIC BLOOD PRESSURE: 74 MMHG | HEIGHT: 64 IN | HEART RATE: 85 BPM | WEIGHT: 184 LBS | BODY MASS INDEX: 31.41 KG/M2 | OXYGEN SATURATION: 98 %

## 2024-10-08 DIAGNOSIS — G44.40 MEDICATION OVERUSE HEADACHE: ICD-10-CM

## 2024-10-08 DIAGNOSIS — M54.81 BILATERAL OCCIPITAL NEURALGIA: ICD-10-CM

## 2024-10-08 DIAGNOSIS — G43.711 INTRACTABLE CHRONIC MIGRAINE WITHOUT AURA AND WITH STATUS MIGRAINOSUS: Primary | ICD-10-CM

## 2024-10-08 PROCEDURE — 1111F DSCHRG MED/CURRENT MED MERGE: CPT | Performed by: OTHER

## 2024-10-08 PROCEDURE — 3078F DIAST BP <80 MM HG: CPT | Performed by: OTHER

## 2024-10-08 PROCEDURE — 3077F SYST BP >= 140 MM HG: CPT | Performed by: OTHER

## 2024-10-08 PROCEDURE — 99214 OFFICE O/P EST MOD 30 MIN: CPT | Performed by: OTHER

## 2024-10-08 PROCEDURE — 3008F BODY MASS INDEX DOCD: CPT | Performed by: OTHER

## 2024-10-08 RX ORDER — FREMANEZUMAB-VFRM 225 MG/1.5ML
225 INJECTION SUBCUTANEOUS
Qty: 1 EACH | Refills: 3 | Status: SHIPPED | OUTPATIENT
Start: 2024-10-08

## 2024-10-08 RX ORDER — DULOXETIN HYDROCHLORIDE 60 MG/1
60 CAPSULE, DELAYED RELEASE ORAL DAILY
Qty: 90 CAPSULE | Refills: 0 | Status: SHIPPED | OUTPATIENT
Start: 2024-10-08 | End: 2025-01-06

## 2024-10-08 RX ORDER — RIMEGEPANT SULFATE 75 MG/75MG
75 TABLET, ORALLY DISINTEGRATING ORAL AS NEEDED
Qty: 8 TABLET | Refills: 0 | Status: SHIPPED | OUTPATIENT
Start: 2024-10-08 | End: 2025-10-08

## 2024-10-08 NOTE — PATIENT INSTRUCTIONS
Preventive: Continue Botox  Ask for extra Botox in your trapezius muscles   Preventive: Continue Verapamil  Preventive: Continue Duloxetine - increase to 60 mg daily   Preventive: Add Ajovy  Preventive: Nerve blocks   Cefaly device (out of pocket)  Nerivio device (insurance does cover)  Rescue: Nurtec.  Metoclopramide is fine to use SPARINGLY - less than 8 days per month.  You can use Benadryl 25 mg with Metoclopramide.  Stop using if you feel restless, muscle twitching.    Trigger point injections   Stop using Tylenol daily - it is leading to medication overuse headache

## 2024-10-08 NOTE — PROGRESS NOTES
[facility]  [address] [place] [address], [id number]  [facility] [id number]  [phone]          Established  Follow Up Visit       Date: October 8, 2024  Patient Name: Allie Mae   MRN: [id number]  Primary physician: [place] [address]    Interval History:   -- Since last office visit, she had Botox on 9/20/2024; was seen in the ER 9/26/2024 for migraine and was given Reglan and extra-strength Tylenol.  She was also discharged on Fioricet without codeine.  She was requesting Fioricet with codeine.  She discussed with her primary and multiple trials of medications are not helping.  -Went to a different ER on 10/1/2024 complaining of headaches.  - She is using 3 Tylenol's daily.    OUTPATIENT MEDICATIONS  Current Outpatient Medications on File Prior to Visit   Medication Sig Dispense Refill    ALPRAZolam 0.25 MG Oral Tab       DULoxetine 30 MG Oral Cap DR Particles Take 1 capsule (30 mg total) by mouth daily. 90 capsule 0    Butalbital-APAP-Caffeine -40 MG Oral Cap TAKE 1 CAPSULE BY MOUTH ONSET OF HEADACHE. NO MORE THAN 1 CAPSULE IN 24 HOURS      clobetasol 0.05 % External Solution Apply to affected area on scalp twice daily x 2-3 weeks as directed      clotrimazole 10 MG Mouth/Throat Laina DISSOLVE 1 LOZENGE BY MOUTH THREE TIMES DAILY AS NEEDED      dexamethasone (DECADRON) 4 MG tablet       diazePAM 2 MG Oral Tab       diclofenac 50 MG Oral Tab EC       doxycycline 100 MG Oral Cap       famotidine 40 MG Oral Tab Take 1 tablet (40 mg total) by mouth 2 (two) times daily.      fluconazole 150 MG Oral Tab       fluticasone propionate 50 MCG/ACT Nasal Suspension 2 sprays by Nasal route daily.      gabapentin 100 MG Oral Cap Take 1 capsule (100 mg total) by mouth 3 (three) times daily.      loratadine 10 MG Oral Tab Take 1 tablet (10 mg total) by mouth daily.      Olmesartan Medoxomil 40 MG Oral Tab       ondansetron 4 MG Oral Tablet Dispersible Take 1 tablet (4 mg total) by  mouth every 8 (eight) hours as needed.      pantoprazole 40 MG Oral Tab EC Take 1 tablet (40 mg total) by mouth 2 (two) times daily.      propranolol 20 MG Oral Tab Take 1 tablet (20 mg total) by mouth 2 (two) times daily.      sucralfate 1 GM/10ML Oral Suspension       hydrALAZINE 50 MG Oral Tab Take 1 tablet (50 mg total) by mouth 3 (three) times daily.      ubrogepant (UBRELVY) 100 MG Oral Tab Take one tablet at onset of migraine.  May take additional tablet in 2 hours if needed.  Do not exceed two tablets per 24 hour period. 10 tablet 0    hydrALAZINE 50 MG Oral Tab Take 1 tablet (50 mg total) by mouth 3 (three) times daily.      simvastatin 40 MG Oral Tab Take 1 tablet (40 mg total) by mouth daily. 90 tablet 1    azithromycin (ZITHROMAX Z-ELISEO) 250 MG Oral Tab Take two tablets today, then one tablet daily for 4 more days (Patient not taking: Reported on 6/7/2024) 6 tablet 0    Probiotic Product (PROBIOTIC DAILY OR) Take by mouth.      verapamil 120 MG Oral Tab TAKE 1 TABLET BY MOUTH TWICE DAILY 180 tablet 1    Esomeprazole Magnesium 20 MG Oral Capsule Delayed Release Take 1 capsule (20 mg total) by mouth 2 (two) times a day. Before meal 120 capsule 0    IRBESARTAN 150 MG Oral Tab TAKE 1 TABLET BY MOUTH TWICE DAILY 180 tablet 1    METFORMIN 500 MG Oral Tab TAKE 1 TABLET(500 MG) BY MOUTH TWICE DAILY WITH MEALS 180 tablet 1    Azelastine HCl 0.1 % Nasal Solution 1 spray by Nasal route 2 (two) times daily. 1 each 3    levothyroxine 50 MCG Oral Tab Take 1 tablet (50 mcg total) by mouth every morning before breakfast. 90 tablet 1    saccharomyces boulardii 250 MG Oral Cap Take 1 capsule (250 mg total) by mouth 2 (two) times daily. (Patient not taking: Reported on 6/7/2024) 60 capsule 1    Levocetirizine Dihydrochloride 5 MG Oral Tab Take 1 tablet (5 mg total) by mouth every evening. 90 tablet 0    Multiple Vitamins-Minerals (ICAPS AREDS 2 OR) Take 2 Scoops by mouth. 2 scoops at night (9.3gram) (Patient not taking:  Reported on 6/7/2024)      cycloSPORINE (RESTASIS) 0.05 % Ophthalmic Emulsion Place 1 drop into both eyes 2 (two) times daily. 180 each 3    Glucose Blood (ACCU-CHEK CARIE PLUS) In Vitro Strip Test two times daily. 100 strip 3     No current facility-administered medications on file prior to visit.         PHYSICAL EXAM:     /74 (BP Location: Left arm, Patient Position: Sitting)   Pulse 85   Ht 64\"   Wt 184 lb (83.5 kg)   SpO2 98%   BMI 31.58 kg/m²   General appearance: Well appearing, and in no acute distress  Skin: skin color, texture normal.  No rashes or lesions.    Head: Normocephalic, atraumatic.    Neurological exam:    Mental Status:   Attention/Concentration: intact attention on bedside test   Fund of knowledge: intact  Speech: no dysarthria or aphasia     Trapezius tender points particularly on the right side  Greater and lesser occipital nerve tenderness to palpation bilaterally    LABS/DATA:  Hyponatremia 125, elevated alkaline phosphatase, normal CBC      IMAGING:  MRI brain August 2024 with small vessel disease    ASSESSMENT:  The patient is a 69 year old  woman with past medical history of migraines, hypertension and hyperlipidemia, anxiety, diabetes, chronic kidney disease stage III who presents for follow up.    Her neurological examination is normal.       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       Prior medication trials: Topiramate, Sumatriptan, Fioricet, Verapamil, Irbesartan, Nortriptyline      Chronic migraine without aura   Occipital neuralgia bilaterally  Medication overuse headache   -Preventative: Continue Duloxetine but increase to 60 mg daily, Botox (may ask for an extra doses to be given in her trapezius and occipital areas), Verapamil.  Add Ajovy    Cefaly device  Nerivio device  Future considerations: Emgality, atogepant, Vyepti  -Abortive: Nurtec.  May use metoclopramide but advised to stop if EPS occurs, should use with Benadryl to  help avoid this, less than 8 days/month.  No triptans due to uncontrolled hypertension.  No Fioricet due to risk of MOH and interaction with benzodiazepines.                 Future considerations: Tizanidine                Limit non-CGRP rescue medications <2 times per week to avoid developing medication overuse headaches   -Bilateral occipital nerve blocks  -Neuroimaging: MRI brain was completed  -Yearly optometry/ophthalmology dilated eye exam for ocular health     -Follow up: 3 months   -Lifestyle information provided      We discussed at length reasonable goals and expectations of migraine management.  This is 50% improvement in frequency and/or severity, not eradication of her headaches/migraines completely.  She is still in medication overuse headache.  I discussed with her that she has chronic kidney disease and elevated transaminases and must stop overusing Tylenol.    Discussed indication, administration, dose, and side effects with patient of any medications personally prescribed. Patient was advised to let my office know if they have any questions or concerns.       Today, I personally spent 20 minutes in this case, including chart review, time spent with patient doing face to face evaluation w/ interview and exam and patient education, counseling, and time was spent in patient education, counseling, and coordination of care as described above.   Issues discussed: Diagnosis and implications on future health, benefits and side effects of present and future medications, test results as well as further testing and medications required.    This note was prepared using Dragon Medical voice recognition dictation software and as a result, errors may occur. When identified, these errors have been corrected. While every attempt is made to correct errors during dictation, discrepancies may still exist    LATOYA Sutton DO   Staff Physician, Neurology   10/8/2024  10:45 AM

## 2024-10-08 NOTE — TELEPHONE ENCOUNTER
Initiated greater and less occipital nerve blocks with steroid and trigger point injections  CPT Code: 06279, , 60106 & Dx: G43.711 & G44.40 with site Availity.   Status: No auth required.

## 2024-10-17 ENCOUNTER — MED REC SCAN ONLY (OUTPATIENT)
Dept: NEUROLOGY | Facility: CLINIC | Age: 69
End: 2024-10-17

## 2024-11-13 ENCOUNTER — TELEPHONE (OUTPATIENT)
Dept: NEUROLOGY | Facility: CLINIC | Age: 69
End: 2024-11-13

## 2024-11-13 RX ORDER — RIMEGEPANT SULFATE 75 MG/75MG
1 TABLET, ORALLY DISINTEGRATING ORAL AS NEEDED
Qty: 8 TABLET | Refills: 0 | OUTPATIENT
Start: 2024-11-13

## 2024-11-13 RX ORDER — RIMEGEPANT SULFATE 75 MG/75MG
75 TABLET, ORALLY DISINTEGRATING ORAL AS NEEDED
Qty: 8 TABLET | Refills: 3 | Status: SHIPPED | OUTPATIENT
Start: 2024-11-13 | End: 2025-11-13

## 2024-11-13 NOTE — TELEPHONE ENCOUNTER
Pt requesting a refill of Nurtec 75mg ODT.    Last office visit: 10/8/24    Next office visit: 12/20/24     ASSESSMENT:  The patient is a 69 year old  woman with past medical history of migraines, hypertension and hyperlipidemia, anxiety, diabetes, chronic kidney disease stage III who presents for follow up.    Her neurological examination is normal.       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       Prior medication trials: Topiramate, Sumatriptan, Fioricet, Verapamil, Irbesartan, Nortriptyline      Chronic migraine without aura   Occipital neuralgia bilaterally  Medication overuse headache   -Preventative: Continue Duloxetine but increase to 60 mg daily, Botox (may ask for an extra doses to be given in her trapezius and occipital areas), Verapamil.  Add Ajovy                Cefaly device  Nerivio device  Future considerations: Emgality, atogepant, Vyepti  -Abortive: Nurtec.  May use metoclopramide but advised to stop if EPS occurs, should use with Benadryl to help avoid this, less than 8 days/month.  No triptans due to uncontrolled hypertension.  No Fioricet due to risk of MOH and interaction with benzodiazepines.                 Future considerations: Tizanidine                Limit non-CGRP rescue medications <2 times per week to avoid developing medication overuse headaches   -Bilateral occipital nerve blocks  -Neuroimaging: MRI brain was completed  -Yearly optometry/ophthalmology dilated eye exam for ocular health     -Follow up: 3 months   -Lifestyle information provided       We discussed at length reasonable goals and expectations of migraine management.  This is 50% improvement in frequency and/or severity, not eradication of her headaches/migraines completely.  She is still in medication overuse headache.  I discussed with her that she has chronic kidney disease and elevated transaminases and must stop overusing Tylenol.     Discussed indication, administration, dose,  and side effects with patient of any medications personally prescribed. Patient was advised to let my office know if they have any questions or concerns.         Today, I personally spent 20 minutes in this case, including chart review, time spent with patient doing face to face evaluation w/ interview and exam and patient education, counseling, and time was spent in patient education, counseling, and coordination of care as described above.   Issues discussed: Diagnosis and implications on future health, benefits and side effects of present and future medications, test results as well as further testing and medications required.     This note was prepared using Dragon Medical voice recognition dictation software and as a result, errors may occur. When identified, these errors have been corrected. While every attempt is made to correct errors during dictation, discrepancies may still exist     LATOYA Sutton DO   Staff Physician, Neurology   10/8/2024  10:45 AM

## 2024-11-13 NOTE — TELEPHONE ENCOUNTER
Pt called looking to get a refill on her nurtec [id number] - [place], [place] - [address] AT [facility] OF [address] [phone], [phone]

## 2024-11-19 ENCOUNTER — TELEPHONE (OUTPATIENT)
Dept: NEUROLOGY | Facility: CLINIC | Age: 69
End: 2024-11-19

## 2024-11-19 DIAGNOSIS — G43.711 INTRACTABLE CHRONIC MIGRAINE WITHOUT AURA AND WITH STATUS MIGRAINOSUS: Primary | ICD-10-CM

## 2024-11-19 NOTE — TELEPHONE ENCOUNTER
Pt called in is looking to speak to clinical team. Pt advised she got an emg done yesterday and passed out broke a finger. Pt is looking to speak to clinical team advised that nurtec is not helping and is taking as instructed. Advised migraines are bad and do not let her sleep. Pls advise.

## 2024-11-19 NOTE — TELEPHONE ENCOUNTER
Returned PC to the patient. She reported a migraine so bad on 11/15/24 that she lost consciousness. She tried Tylenol PRN, but it did not help. She was out of Nurtec until yesterday. She received the Nurtec yesterday which helped but she woke up this morning with a bad headache. She stopped taking Duloxetine 60mg daily 3-4 weeks ago secondary to constipation. She is taking Claritin for allergies. Feels like she may have a cold. Confirmed that the patient is taking Ajovy 225mg/1.5mL injectors.     Acute Migraine assessment:    Is this your typical migraine?  Describe any change in character from past migraines.  Yes: 8/10    Location of Pain (select all that apply):  right side. Behind eyes  # Days pain has been present:  4 days    Describe the pain:  \"just pain, no throbbing, stabbing,\"   Intensity of the headaches:    With medication: SEVERE   Without medication INCAPACITATING  Associated Symptoms (check all that apply):  Nausea/Upset Stomach   Non-pharmaceutical interventions tried and outcome:   [] Lying down / sleeping:  BETTER  [] Being in a dark quiet room:  BETTER  [] Massage your head: NO CHANGE  [] Cold pack on your head/neck:  not tried  [] Hot pack on your head/neck:  not tried      Abortive Medications tried:  Nurtec, Tylenol  Current preventative medication list:  Verapamil 120mg TID, for BP    Any missed doses?  No  Willing to try Medrol Dosepak?  No, makes her nauseated  Willing to try Toradol/Decadron injections?  No  She went to immediate care on Friday and she does not want to go to the emergency room. Encouraged her to go to the ER with any worsening or persistent symptoms.  Will discuss with Dr. Sutton and follow-up with patient.

## 2024-11-20 RX ORDER — METOCLOPRAMIDE 10 MG/1
10 TABLET ORAL EVERY 6 HOURS PRN
Qty: 90 TABLET | Refills: 0 | Status: SHIPPED | OUTPATIENT
Start: 2024-11-20 | End: 2024-12-20

## 2024-11-20 RX ORDER — TIZANIDINE 2 MG/1
1 TABLET ORAL 2 TIMES DAILY
Qty: 90 TABLET | Refills: 0 | Status: SHIPPED | OUTPATIENT
Start: 2024-11-20 | End: 2025-02-18

## 2024-11-20 NOTE — TELEPHONE ENCOUNTER
Per Dr. Sutton, instructed the patient to start Tizanidine 1-1 and 1mg daily PRN. Also, informed her that she can take Benadryl and reglan daily PRN for breakthrough migraines. The patient stated that she is allergic to Benadryl and cannot take it. Will notify Dr. Sutton. Encouraged the patient to call back if her symptoms persist or worsen or if she has any other concerns. She verbalized understanding of instructions.

## 2024-11-26 ENCOUNTER — TELEPHONE (OUTPATIENT)
Dept: NEUROLOGY | Facility: CLINIC | Age: 69
End: 2024-11-26

## 2024-11-26 NOTE — TELEPHONE ENCOUNTER
Phone call returned to pt. Pt states that her headaches have been awful. 170/72 this am 178/75 last night. Pt states that since she has been taking the tizanidine, she feels dizzy. Pt states that she started to take this medication on 11/20, and she states that her BP has been elevated over the last 3 days. RN advised that the tizanidine is not causing her higher BP numbers. She also states that the Nurtec is only helping her for 12 hours and then her headaches creep back up. She would like to know how to proceed.

## 2024-11-26 NOTE — TELEPHONE ENCOUNTER
Pt called in is wanting to speak to clinical team regarding medication tiZANidine 2 MG Oral Tab. Pt advised that her blood pressure is at 178-180 only new medication she's been given is this one. Pt looking to further discuss with clinical team. Confirmed [id number] - [facility] [address] AT [facility] OF [address] [facility] [address] [phone], [phone] . Pls advise.

## 2024-11-27 NOTE — TELEPHONE ENCOUNTER
Contacted pt offered Thursday 12/12 @ 8:20 VV pt declined appt doesn't have my chart and would need later time for her to come in. Pls advise where to schedule pt.

## 2024-12-05 ENCOUNTER — OFFICE VISIT (OUTPATIENT)
Dept: NEUROLOGY | Facility: CLINIC | Age: 69
End: 2024-12-05
Payer: COMMERCIAL

## 2024-12-05 ENCOUNTER — TELEPHONE (OUTPATIENT)
Dept: NEUROLOGY | Facility: CLINIC | Age: 69
End: 2024-12-05

## 2024-12-05 VITALS — BODY MASS INDEX: 30.73 KG/M2 | WEIGHT: 180 LBS | HEIGHT: 64 IN

## 2024-12-05 DIAGNOSIS — G43.711 INTRACTABLE CHRONIC MIGRAINE WITHOUT AURA AND WITH STATUS MIGRAINOSUS: Primary | ICD-10-CM

## 2024-12-05 DIAGNOSIS — G44.40 MEDICATION OVERUSE HEADACHE: ICD-10-CM

## 2024-12-05 DIAGNOSIS — M54.81 BILATERAL OCCIPITAL NEURALGIA: ICD-10-CM

## 2024-12-05 PROCEDURE — 3008F BODY MASS INDEX DOCD: CPT | Performed by: OTHER

## 2024-12-05 PROCEDURE — 99214 OFFICE O/P EST MOD 30 MIN: CPT | Performed by: OTHER

## 2024-12-05 RX ORDER — RIMEGEPANT SULFATE 75 MG/75MG
75 TABLET, ORALLY DISINTEGRATING ORAL AS NEEDED
Qty: 8 TABLET | Refills: 3 | Status: CANCELLED | OUTPATIENT
Start: 2024-12-05 | End: 2025-12-05

## 2024-12-05 RX ORDER — RIMEGEPANT SULFATE 75 MG/75MG
TABLET, ORALLY DISINTEGRATING ORAL
Qty: 16 TABLET | Refills: 5 | Status: SHIPPED | OUTPATIENT
Start: 2024-12-05

## 2024-12-05 NOTE — TELEPHONE ENCOUNTER
[facility] called in advised they got Zavegepant HCl 10 MG/ACT Nasal Solution medication. Advised that they cannot dispense it as medication needs to go to speciality rx. Pls advise.

## 2024-12-05 NOTE — PATIENT INSTRUCTIONS
Occipital nerve blocks were APPROVED.  You can schedule these.     Take Nurtec every other day as preventive. Stop Ajovy.    Zavegepant as needed for migraine.

## 2024-12-05 NOTE — PROGRESS NOTES
[facility] [place]  [address], [address]  [place] [address]  [phone]          Established  Follow Up Visit       Date: December 5, 2024  Patient Name: Allie Mae   MRN: [id number]  Primary physician: Barbara [place] [address]    Interval History:   --Tizanidine caused hypertension.  Nurtec not effective.  Reviewed EMR: syncope, headaches.  Nurtec is now effective.  No migraines for past 2 weeks until today; she has a mild one currently.  Lost consciousness from severe migraine pain, hurt her left shoulder, does not think she hit her head.  No CT brain done.        OUTPATIENT MEDICATIONS  Medications Ordered Prior to Encounter[1]      PHYSICAL EXAM:   There were no vitals taken for this visit.  General appearance: Well appearing, and in no acute distress  Skin: skin color, texture normal.  No rashes or lesions.    Head: Normocephalic, atraumatic.    Neurological exam:    Mental Status:   Attention/Concentration: intact attention on bedside test   Fund of knowledge: intact  Speech: no dysarthria or aphasia     LABS/DATA:  [place] CBC and CMP       IMAGING:  MRI brain   Chronic microvascular ischemic disease    ASSESSMENT:  The patient is a 69 year old woman with past medical history of migraines, hypertension and hyperlipidemia, anxiety, diabetes, chronic kidney disease stage III who presents for follow up.    Her neurological examination is normal.       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       Prior medication trials: Topiramate, Sumatriptan, Fioricet, Verapamil, Irbesartan, Nortriptyline, Nurtec, Ubrelvy, Lyrica (allergy), Gabapentin (caused dizziness)     Chronic migraine without aura   Occipital neuralgia bilaterally  Medication overuse headache   -Preventative: Continue Duloxetine 60 mg daily, Botox, Verapamil, change Ajovy to Nurtec.                 Cefaly device  Nerivio device  Occipital nerve blocks (bilateral)   Future  considerations: Emgality, Atogepant, Vyepti  -Abortive: Zavegepant   No triptans due to uncontrolled hypertension.  No Fioricet due to risk of MOH and interaction with benzodiazepines.                 Limit non-CGRP rescue medications <2 times per week to avoid developing medication overuse headaches   -Yearly optometry/ophthalmology dilated eye exam for ocular health     -Follow up: 3 months   -Lifestyle information provided        Botox Authorization/Reauthorization   Patient has had 3-4 migraines in the last month. Headaches last approximately 4 hours. Prior to starting Botox injections patient had 30/30 migraines each month lasting appoximately 4 hours. Patient's migraines have reduced by >50% migraines per month since starting botox injections.  We find Botox medically necessary for the patient to continue to control the migraine frequency, intensity and duration.  Patient has been compliant in botox injections.          Discussed indication, administration, dose, and side effects with patient of any medications personally prescribed. Patient was advised to let my office know if they have any questions or concerns.       Today, I personally spent 20 minutes in this case, including chart review, time spent with patient doing face to face evaluation w/ interview and exam and patient education, counseling, and time was spent in patient education, counseling, and coordination of care as described above.   Issues discussed: Diagnosis and implications on future health, benefits and side effects of present and future medications, test results as well as further testing and medications required.    This note was prepared using Dragon Medical voice recognition dictation software and as a result, errors may occur. When identified, these errors have been corrected. While every attempt is made to correct errors during dictation, discrepancies may still exist    LATOYA Sutton DO   Staff Physician, Neurology   12/5/2024  11:42  AM               [1]   Current Outpatient Medications on File Prior to Visit   Medication Sig Dispense Refill    tiZANidine 2 MG Oral Tab Take 0.5 tablets (1 mg total) by mouth in the morning and 0.5 tablets (1 mg total) before bedtime. May take extra dose once daily as needed for migraine.. 90 tablet 0    metoclopramide (REGLAN) 10 MG Oral Tab Take 1 tablet (10 mg total) by mouth every 6 (six) hours as needed (Migraine/nausea.). 90 tablet 0    Rimegepant Sulfate (NURTEC) 75 MG Oral Tablet Dispersible Take 75 mg by mouth as needed. Take one tablet at onset of migraine.  Maximum dose in 24 hours is 1 tablet (75mg). 8 tablet 3    Fremanezumab-vfrm (AJOVY) 225 MG/1.5ML Subcutaneous Solution Auto-injector Inject 225 mg into the skin every 30 (thirty) days. 1 each 3    ALPRAZolam 0.25 MG Oral Tab       clobetasol 0.05 % External Solution Apply to affected area on scalp twice daily x 2-3 weeks as directed      clotrimazole 10 MG Mouth/Throat Laina DISSOLVE 1 LOZENGE BY MOUTH THREE TIMES DAILY AS NEEDED      diclofenac 50 MG Oral Tab EC       doxycycline 100 MG Oral Cap       famotidine 40 MG Oral Tab Take 1 tablet (40 mg total) by mouth 2 (two) times daily.      fluconazole 150 MG Oral Tab       fluticasone propionate 50 MCG/ACT Nasal Suspension 2 sprays by Nasal route daily.      loratadine 10 MG Oral Tab Take 1 tablet (10 mg total) by mouth daily.      Olmesartan Medoxomil 40 MG Oral Tab       ondansetron 4 MG Oral Tablet Dispersible Take 1 tablet (4 mg total) by mouth every 8 (eight) hours as needed.      pantoprazole 40 MG Oral Tab EC Take 1 tablet (40 mg total) by mouth 2 (two) times daily.      sucralfate 1 GM/10ML Oral Suspension       hydrALAZINE 50 MG Oral Tab Take 1 tablet (50 mg total) by mouth 3 (three) times daily.      hydrALAZINE 50 MG Oral Tab Take 1 tablet (50 mg total) by mouth 3 (three) times daily.      simvastatin 40 MG Oral Tab Take 1 tablet (40 mg total) by mouth daily. 90 tablet 1    Probiotic  Product (PROBIOTIC DAILY OR) Take by mouth.      verapamil 120 MG Oral Tab TAKE 1 TABLET BY MOUTH TWICE DAILY 180 tablet 1    Esomeprazole Magnesium 20 MG Oral Capsule Delayed Release Take 1 capsule (20 mg total) by mouth 2 (two) times a day. Before meal 120 capsule 0    METFORMIN 500 MG Oral Tab TAKE 1 TABLET(500 MG) BY MOUTH TWICE DAILY WITH MEALS 180 tablet 1    Azelastine HCl 0.1 % Nasal Solution 1 spray by Nasal route 2 (two) times daily. 1 each 3    levothyroxine 50 MCG Oral Tab Take 1 tablet (50 mcg total) by mouth every morning before breakfast. 90 tablet 1    Levocetirizine Dihydrochloride 5 MG Oral Tab Take 1 tablet (5 mg total) by mouth every evening. 90 tablet 0    cycloSPORINE (RESTASIS) 0.05 % Ophthalmic Emulsion Place 1 drop into both eyes 2 (two) times daily. 180 each 3    Glucose Blood (ACCU-CHEK CARIE PLUS) In Vitro Strip Test two times daily. 100 strip 3     No current facility-administered medications on file prior to visit.

## 2024-12-05 NOTE — TELEPHONE ENCOUNTER
Called [facility] in regards to patient medication Zavegepant,[phone] gave them a verbal   Medication Quantity Refills Start End   Zavegepant HCl 10 MG/ACT Nasal Solution 1 each 2024 --   Si spray by Nasal route As Directed. Intranasal: One spray (10 mg/spray) in 1 nostril as a single dose. Maximum: One spray (10 mg) per 24 hours     Route:   Nasal     Order #:   [id number]      On this script due to the fact they were unable to see the rx in their system.        [facility] in advised they got Zavegepant HCl 10 MG/ACT Nasal Solution medication. Advised that they cannot dispense it as medication needs to go to speciality rx. Pls advise.

## 2024-12-20 ENCOUNTER — OFFICE VISIT (OUTPATIENT)
Dept: NEUROLOGY | Facility: CLINIC | Age: 69
End: 2024-12-20
Payer: COMMERCIAL

## 2024-12-20 DIAGNOSIS — G43.711 INTRACTABLE CHRONIC MIGRAINE WITHOUT AURA AND WITH STATUS MIGRAINOSUS: Primary | ICD-10-CM

## 2024-12-20 NOTE — PROCEDURES
PROCEDURE: Botox Injections (MIGRAINE PROTOCOL)   PRE-OPERATIVE DIAGNOSIS: Chronic Migraine  POST-OPERATIVE DIAGNOSIS: same as above   BLOOD LOSS: minimal COMPLICATIONS: none     DESCRIPTION OF PROCEDURE: After a discussion of the risks and benefits, the patient consented to the procedure. The patient was taken to the procedure room. The upper back, neck, and occipital area was prepped with alcohol swabs. The 2 Botox vials containing 100 units each, were reconstituted with saline.     There are 31 distinct targets in the standard protocol.    Following muscles and units were injected:     10 units divided between 2 sites.    Procerus 5 units in 1 site.    Frontalis 20 units divided between 4 sites.    Temporalis 40 units divided between 8 sites.    Occipitalis 30 units divided between 6 sites.    Cervical paraspinal 20 units divided between 4 sites.    Trapezius 40 units divided between 8 sites.    Patient tolerated the procedure well.    Total of 165 Units of botulinum toxin were used and 35 Units were wasted.

## 2024-12-20 NOTE — PROGRESS NOTES
Paperwork noting that patient may bear financial responsibility for procedure(s) performed in clinic today signed prior to proceeding with procedure(s).    Furthermore, patient notified that they should contact their insurer to verify that your procedure/test has been approved and is a COVERED benefit.  Although the [facility] staff does its due diligence, the insurance carrier gives the disclaimer that \"Although the procedure is authorized, this does not guarantee payment.\"    Ultimately the patient is responsible for payment.    Botox is:  [x] Buy and Bill  [] Patient Supplied      Botox Reauthorization Questions:  Has the patient experienced a reduction in frequency of migraines since starting Botox? yes  If yes, by what percentage? 75%  Has the intensity of migraines decreased since starting Botox? yes  If yes, by what percentage? 75%    [x] I have discussed with patient that if their insurance changes they must contact the office right away with that information so that a new prior authorization can be completed.  Patient verbalized understanding that Botox cannot be performed without a current prior authorization in place with correct insurance.

## 2025-01-07 ENCOUNTER — TELEPHONE (OUTPATIENT)
Dept: NEUROLOGY | Facility: CLINIC | Age: 70
End: 2025-01-07

## 2025-01-07 DIAGNOSIS — G43.711 INTRACTABLE CHRONIC MIGRAINE WITHOUT AURA AND WITH STATUS MIGRAINOSUS: Primary | ICD-10-CM

## 2025-01-08 NOTE — TELEPHONE ENCOUNTER
Phone call returned to pt. Advised pt on Dr. Sutton's message and recommendations. Pt verbalized understanding and agrees to plan of care. Order faxed to infusion center.

## 2025-01-08 NOTE — TELEPHONE ENCOUNTER
Phone call returned to pt. Pt states that she has a lot of pain due to a headache, she has pain because she had passed out. Pt states that she has a bulging in her neck. Pt states that she has been taking her migraine medications, but that she is wanting to know what else that she can take.

## 2025-01-09 PROBLEM — G43.711 CHRONIC MIGRAINE WITHOUT AURA, WITH INTRACTABLE MIGRAINE, SO STATED, WITH STATUS MIGRAINOSUS: Status: ACTIVE | Noted: 2025-01-09

## 2025-01-09 RX ORDER — ONDANSETRON 2 MG/ML
4 INJECTION INTRAMUSCULAR; INTRAVENOUS ONCE
Status: CANCELLED | OUTPATIENT
Start: 2025-01-09

## 2025-01-09 RX ORDER — KETOROLAC TROMETHAMINE 30 MG/ML
30 INJECTION, SOLUTION INTRAMUSCULAR; INTRAVENOUS ONCE
Status: CANCELLED | OUTPATIENT
Start: 2025-01-09

## 2025-01-10 RX ORDER — ONDANSETRON 2 MG/ML
4 INJECTION INTRAMUSCULAR; INTRAVENOUS ONCE
OUTPATIENT
Start: 2025-01-10

## 2025-01-10 RX ORDER — KETOROLAC TROMETHAMINE 30 MG/ML
30 INJECTION, SOLUTION INTRAMUSCULAR; INTRAVENOUS ONCE
OUTPATIENT
Start: 2025-01-10

## 2025-01-14 ENCOUNTER — TELEPHONE (OUTPATIENT)
Age: 70
End: 2025-01-14

## 2025-01-14 NOTE — TELEPHONE ENCOUNTER
called and spoke with patients daughter Krystyna who states patient is currently in hospital. Informed daughter to have patient call and schedule if she is still in need of infusion-[facility]

## 2025-02-10 ENCOUNTER — TELEPHONE (OUTPATIENT)
Dept: PHYSICAL MEDICINE AND REHAB | Facility: CLINIC | Age: 70
End: 2025-02-10

## 2025-02-10 NOTE — TELEPHONE ENCOUNTER
Initiated authorization for Botox 200U. CPT/hospitals , [id number] dx:G43.657 with [employer] portal.    Clinicals faxed/uploaded to [facility] portal  Status: Pending  Case # [id number]

## 2025-02-26 ENCOUNTER — TELEPHONE (OUTPATIENT)
Dept: NEUROLOGY | Facility: CLINIC | Age: 70
End: 2025-02-26

## 2025-02-26 NOTE — TELEPHONE ENCOUNTER
Pt called in is looking to speak to clinical team. Advised to have a horrible headache. Had a procedure done and wanted to know if ok to take Nurtec, Pls advise.

## 2025-03-05 NOTE — TELEPHONE ENCOUNTER
Phone call returned to pt. Pt states that she is unsure what questions that she had for the provider. Pt states that she is waking up with headache every morning, pt is taking her Nurtec every other day as prescribed. Pt states that she also has to use Tylenol to take the pain level down. Pt is concerned about this. Pt states that she is no longer doing Botox at this time, she claims she was advised while in hospital to not proceed with this at the time due to spine surgery. Pt states that she was taking allergy medication OTC, and she had success with Claritin or Zyrtec. Pt is now taking Singulair.

## (undated) NOTE — LETTER
Date: 2024      Patient Name: Allie Mae      : 1955        Thank you for choosing [facility] as your health care provider. Your physician has deemed the following medical service(s) necessary. However, your insurance plan may not pay for all of your health care and costs and may deny payment for this service. The fact that your insurance plan does not pay for an item or service does not mean you should not receive it. The purpose of this form is to help you make an informed decision about whether or not you want to receive this service(s) that may not be paid for by your insurance plan.    CPT Code Description     Cost     Botox 200 U      $4,000  _________ ______________________________  _____________      _________ ______________________________ _____________      _________ ______________________________ _____________      I understand that the above mentioned service(s) or supply may not be covered by my insurance company. I agree to be financially responsible for the cost of this service or supply in the event of my insurance denies payment as a non-covered benefit.    3 (PLEASE INFORM THE PATIENT TO CONTACT THE OFFICE IF THE INSURANCE CHANGES WITHIN THE YEAR AS HE/SHE WILL BE RESPONSIBLE TO UPDATE THE OFFICE IF INSURANCE CHANGES SO THAT PROCEDURE IS BILLED CORRECTLY) 2 of 3       ______________________________________________________________________  Signature of Patient or Patient's Representative  Relationship  Date    ______________________________________________________________________  Signature of Witness to signing of form   Printed Name

## (undated) NOTE — LETTER
AUTHORIZATION FOR SURGICAL OPERATION OR OTHER PROCEDURE    1. I hereby authorize Dr. Andrei Montoya and the [facility] staff assigned to my case to perform the following operation and/or procedure at the [facility]:    _______________________________________________________________________________________________    Botox 200 units    Buy&Bill     Referred by Awa Sutton DO   _______________________________________________________________________________________________    2.  My physician has explained the nature and purpose of the operation or other procedure, possible alternative methods of treatment, the risks involved, and the possibility of complication to me.  I acknowledge that no guarantee has been made as to the result that may be obtained.  3.  I recognize that, during the course of this operation, or other procedure, unforseen conditions may necessitate additional or different procedure than those listed above.  I, therefore, further authorize and request that the above named physician, his/her physician assistants or designees perform such procedures as are, in his/her professional opinion, necessary and desirable.  4.  Any tissue or organs removed in the operation or other procedure may be disposed of by and at the discretion of the [facility] staff and [facility].  5.  I understand that in the event of a medical emergency, I will be transported by local paramedics to [facility] or other hospital emergency department.  6.  I certify that I have read and fully understand the above consent to operation and/or other procedure.    7.  I acknowledge that my physician has explained sedation/analgesia administration to me including the risks and benefits.  I consent to the administration of sedation/analgesia as may be necessary or desirable in the judgement of my physician.      Witness signature: ___________________________________________________ Date:   ______/______/_____                    Time:  ________ A.M.  P.M.     Patient Name   Allie Mae    6/23/1955    [id number]         Patient signature:  ___________________________________________________        Statement of Physician  My signature below affirms that prior to the time of the procedure, I have explained to the patient and/or his/her guardian, the risks and benefits involved in the proposed treatment and any reasonable alternative to the proposed treatment.  I have also explained the risks and benefits involved in the refusal of the proposed treatment and have answered the patient's questions.                          Date:  ______/______/_______  Provider                      Signature:  __________________________________________________________       Time:  ___________ A.M    P.M.

## (undated) NOTE — LETTER
Date: 2024      Patient Name: Allie Mae        : 1955    [id number]          Thank you for choosing [facility] as your health care provider. Your physician has deemed the following medical service(s) necessary. However, your insurance plan may not pay for all of your health care and costs and may deny payment for this service. The fact that your insurance plan does not pay for an item or service does not mean you should not receive it. The purpose of this form is to help you make an informed decision about whether or not you want to receive this service(s) that may not be paid for by your insurance plan.    CPT Code Description     Cost     _________ ______BOTOX 200 UNIT__________  __$4000____      _________ ______________________________ _____________      _________ ______________________________ _____________      I understand that the above mentioned service(s) or supply may not be covered by my insurance company. I agree to be financially responsible for the cost of this service or supply in the event of my insurance denies payment as a non-covered benefit.        ______________________________________________________________________  Signature of Patient or Patient's Representative  Relationship  Date          ______________________________________________________________________  Signature of Witness to signing of form   Printed Name

## (undated) NOTE — LETTER
AUTHORIZATION FOR SURGICAL OPERATION OR OTHER PROCEDURE    1. I hereby authorize Dr. 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  My physician has explained the nature and purpose of the operation or other procedure, possible alternative methods of treatment, the risks involved, and the possibility of complication to me.  I acknowledge that no guarantee has been made as to the result that may be obtained.  3.  I recognize that, during the course of this operation, or other procedure, unforseen conditions may necessitate additional or different procedure than those listed above.  I, therefore, further authorize and request that the above named physician, his/her physician assistants or designees perform such procedures as are, in his/her professional opinion, necessary and desirable.  4.  Any tissue or organs removed in the operation or other procedure may be disposed of by and at the discretion of the [facility] staff and [facility].  5.  I understand that in the event of a medical emergency, I will be transported by local paramedics to [facility] or other hospital emergency department.  6.  I certify that I have read and fully understand the above consent to operation and/or other procedure.    7.  I acknowledge that my physician has explained sedation/analgesia administration to me including the risks and benefits.  I consent to the administration of sedation/analgesia as may be necessary or desirable in the judgement of my physician.    Witness signature:  ___________________________________________________ Date:  ______/______/_____                    Time:  ________ A.M.  P.M.       Patient Name:  Allie Mae  6/23/1955  [id number]       Patient signature:  ___________________________________________________                 Statement of Physician  My signature below affirms that prior to the time of the procedure, I have explained to the patient and/or his/her guardian, the risks and benefits involved in the proposed treatment and any reasonable alternative to the proposed treatment.  I have also explained the risks and benefits involved in the refusal of the proposed treatment and have answered the patient's questions.                        Date:  ______/______/_______  Provider                      Signature:  __________________________________________________________       Time:  ___________ A.M    P.M.